# Patient Record
Sex: MALE | Race: WHITE | NOT HISPANIC OR LATINO | Employment: OTHER | URBAN - METROPOLITAN AREA
[De-identification: names, ages, dates, MRNs, and addresses within clinical notes are randomized per-mention and may not be internally consistent; named-entity substitution may affect disease eponyms.]

---

## 2017-01-21 DIAGNOSIS — I50.32 CONGESTIVE HEART FAILURE WITH LEFT VENTRICULAR DIASTOLIC DYSFUNCTION, CHRONIC: ICD-10-CM

## 2017-01-21 DIAGNOSIS — I48.0 PAF (PAROXYSMAL ATRIAL FIBRILLATION): ICD-10-CM

## 2017-01-22 RX ORDER — SOTALOL HYDROCHLORIDE 120 MG/1
TABLET ORAL
Qty: 60 TABLET | Refills: 11 | Status: SHIPPED | OUTPATIENT
Start: 2017-01-22 | End: 2017-02-16 | Stop reason: SDUPTHER

## 2017-01-29 ENCOUNTER — HOSPITAL ENCOUNTER (EMERGENCY)
Facility: HOSPITAL | Age: 82
Discharge: HOME | End: 2017-01-29
Attending: EMERGENCY MEDICINE
Payer: MEDICARE

## 2017-01-29 VITALS
TEMPERATURE: 99 F | OXYGEN SATURATION: 96 % | RESPIRATION RATE: 20 BRPM | SYSTOLIC BLOOD PRESSURE: 152 MMHG | BODY MASS INDEX: 28.33 KG/M2 | DIASTOLIC BLOOD PRESSURE: 68 MMHG | WEIGHT: 209.19 LBS | HEIGHT: 72 IN | HEART RATE: 73 BPM

## 2017-01-29 DIAGNOSIS — E86.0 DEHYDRATION: Primary | ICD-10-CM

## 2017-01-29 DIAGNOSIS — R53.1 WEAKNESS: ICD-10-CM

## 2017-01-29 LAB
ALBUMIN SERPL BCP-MCNC: 3.1 G/DL
ALP SERPL-CCNC: 75 U/L
ALT SERPL W/O P-5'-P-CCNC: 15 U/L
ANION GAP SERPL CALC-SCNC: 8 MMOL/L
AST SERPL-CCNC: 18 U/L
BASOPHILS # BLD AUTO: 0.1 K/UL
BASOPHILS NFR BLD: 0.6 %
BILIRUB SERPL-MCNC: 0.3 MG/DL
BILIRUB UR QL STRIP: NEGATIVE
BUN SERPL-MCNC: 26 MG/DL
CALCIUM SERPL-MCNC: 9 MG/DL
CHLORIDE SERPL-SCNC: 105 MMOL/L
CLARITY UR: CLEAR
CO2 SERPL-SCNC: 26 MMOL/L
COLOR UR: YELLOW
CREAT SERPL-MCNC: 2.4 MG/DL
DIFFERENTIAL METHOD: ABNORMAL
EOSINOPHIL # BLD AUTO: 0.1 K/UL
EOSINOPHIL NFR BLD: 1.2 %
ERYTHROCYTE [DISTWIDTH] IN BLOOD BY AUTOMATED COUNT: 14.9 %
EST. GFR  (AFRICAN AMERICAN): 27 ML/MIN/1.73 M^2
EST. GFR  (NON AFRICAN AMERICAN): 23 ML/MIN/1.73 M^2
FLUAV AG SPEC QL IA: NEGATIVE
FLUBV AG SPEC QL IA: NEGATIVE
GLUCOSE SERPL-MCNC: 205 MG/DL
GLUCOSE UR QL STRIP: NEGATIVE
HCT VFR BLD AUTO: 33.6 %
HGB BLD-MCNC: 10.9 G/DL
HGB UR QL STRIP: NEGATIVE
KETONES UR QL STRIP: NEGATIVE
LEUKOCYTE ESTERASE UR QL STRIP: NEGATIVE
LYMPHOCYTES # BLD AUTO: 1 K/UL
LYMPHOCYTES NFR BLD: 8.6 %
MCH RBC QN AUTO: 29.5 PG
MCHC RBC AUTO-ENTMCNC: 32.3 %
MCV RBC AUTO: 91 FL
MONOCYTES # BLD AUTO: 1.2 K/UL
MONOCYTES NFR BLD: 10.9 %
NEUTROPHILS # BLD AUTO: 8.9 K/UL
NEUTROPHILS NFR BLD: 78.7 %
NITRITE UR QL STRIP: NEGATIVE
PH UR STRIP: 6 [PH] (ref 5–8)
PLATELET # BLD AUTO: 215 K/UL
PMV BLD AUTO: 7.8 FL
POTASSIUM SERPL-SCNC: 4.7 MMOL/L
PROT SERPL-MCNC: 6.1 G/DL
PROT UR QL STRIP: NEGATIVE
RBC # BLD AUTO: 3.68 M/UL
SODIUM SERPL-SCNC: 139 MMOL/L
SP GR UR STRIP: 1.02 (ref 1–1.03)
SPECIMEN SOURCE: NORMAL
URN SPEC COLLECT METH UR: NORMAL
UROBILINOGEN UR STRIP-ACNC: NEGATIVE EU/DL
WBC # BLD AUTO: 11.3 K/UL

## 2017-01-29 PROCEDURE — 25000003 PHARM REV CODE 250: Performed by: EMERGENCY MEDICINE

## 2017-01-29 PROCEDURE — 25000242 PHARM REV CODE 250 ALT 637 W/ HCPCS: Performed by: EMERGENCY MEDICINE

## 2017-01-29 PROCEDURE — 87400 INFLUENZA A/B EACH AG IA: CPT

## 2017-01-29 PROCEDURE — 94640 AIRWAY INHALATION TREATMENT: CPT

## 2017-01-29 PROCEDURE — 80053 COMPREHEN METABOLIC PANEL: CPT

## 2017-01-29 PROCEDURE — 96360 HYDRATION IV INFUSION INIT: CPT

## 2017-01-29 PROCEDURE — 85025 COMPLETE CBC W/AUTO DIFF WBC: CPT

## 2017-01-29 PROCEDURE — 36415 COLL VENOUS BLD VENIPUNCTURE: CPT

## 2017-01-29 PROCEDURE — 81003 URINALYSIS AUTO W/O SCOPE: CPT

## 2017-01-29 PROCEDURE — 99284 EMERGENCY DEPT VISIT MOD MDM: CPT | Mod: 25

## 2017-01-29 RX ORDER — IPRATROPIUM BROMIDE AND ALBUTEROL SULFATE 2.5; .5 MG/3ML; MG/3ML
3 SOLUTION RESPIRATORY (INHALATION)
Status: COMPLETED | OUTPATIENT
Start: 2017-01-29 | End: 2017-01-29

## 2017-01-29 RX ORDER — SODIUM CHLORIDE 9 MG/ML
1000 INJECTION, SOLUTION INTRAVENOUS
Status: COMPLETED | OUTPATIENT
Start: 2017-01-29 | End: 2017-01-29

## 2017-01-29 RX ADMIN — SODIUM CHLORIDE 1000 ML: 0.9 INJECTION, SOLUTION INTRAVENOUS at 04:01

## 2017-01-29 RX ADMIN — IPRATROPIUM BROMIDE AND ALBUTEROL SULFATE 3 ML: .5; 3 SOLUTION RESPIRATORY (INHALATION) at 03:01

## 2017-01-29 NOTE — ED PROVIDER NOTES
Encounter Date: 1/29/2017    SCRIBE #1 NOTE: I, Sussy Garcia, am scribing for, and in the presence of,  Dr. Raygoza. I have scribed the entire note.       History     Chief Complaint   Patient presents with    Fatigue     weakness x 3-4 days.     Review of patient's allergies indicates:   Allergen Reactions    Lisinopril      Cough      Penicillins      Unknown       HPI Comments: 01/29/2017  3:06 PM     Chief Complaint: Fatigue       The patient is a 87 y.o. male who is presenting with fatigue that started 4 days ago. The pt caregiver reports the pt is struggling to walk. No alleviating or exacerbating factors. Associated sx of nausea and weakness. Denies abdominal pain and dysuria. The pt has a past medical history of Asthma; CHF; Coronary artery disease; Dementia; Diabetes mellitus; Dyslipidemia; Hyperlipidemia; Hypertension; and Pacemaker. The pt has a past surgical history that includes Coronary angioplasty with stent (pacemaker); Total hip arthroplasty (Feb 2013 Left); Hernia repair; Cardiac pacemaker placement; and Colonoscopy (N/A, 3/28/2016).'    The history is provided by the patient and a caregiver.     Past Medical History   Diagnosis Date    Asthma     CHF (congestive heart failure)     Coronary artery disease      6 stents    Dementia     Diabetes mellitus     Dyslipidemia     Hyperlipidemia     Hypertension     Pacemaker      No past medical history pertinent negatives.  Past Surgical History   Procedure Laterality Date    Coronary angioplasty with stent placement  pacemaker    Total hip arthroplasty  Feb 2013 Left    Hernia repair      Cardiac pacemaker placement      Colonoscopy N/A 3/28/2016     Procedure: COLONOSCOPY;  Surgeon: Del Ruvalcaba MD;  Location: [facility];  Service: Endoscopy;  Laterality: N/A;     Family History   Problem Relation Age of Onset    Diabetes Mother      Social History   Substance Use Topics    Smoking status: Former Smoker     Years: 40.00      Quit date: 1/1/1952    Smokeless tobacco: Never Used    Alcohol use No     Review of Systems   Constitutional: Positive for fatigue. Negative for fever.   HENT: Negative for sore throat.    Eyes: Negative for visual disturbance.   Respiratory: Negative for shortness of breath.    Cardiovascular: Negative for chest pain.   Gastrointestinal: Negative for abdominal pain and nausea.   Genitourinary: Negative for dysuria.   Musculoskeletal: Negative for back pain.   Skin: Negative for rash.   Neurological: Positive for weakness.   Hematological: Does not bruise/bleed easily.   Psychiatric/Behavioral: Negative for confusion.       Physical Exam   Initial Vitals   BP Pulse Resp Temp SpO2   01/29/17 1446 01/29/17 1446 01/29/17 1446 01/29/17 1446 01/29/17 1446   152/68 67 18 98.5 °F (36.9 °C) 96 %     Physical Exam    Nursing note and vitals reviewed.  Constitutional: He appears well-developed and well-nourished.   HENT:   Head: Normocephalic and atraumatic.   Eyes: Conjunctivae are normal.   Neck: Normal range of motion. Neck supple.   Neck is supple for age.    Cardiovascular: Normal rate, regular rhythm, normal heart sounds and intact distal pulses. Exam reveals no gallop and no friction rub.    No murmur heard.  Pulmonary/Chest: He has wheezes (Expiratory wheezes.). He has no rhonchi. He has no rales.   Abdominal: Soft. Bowel sounds are normal. There is no tenderness. There is no CVA tenderness.   Musculoskeletal: Normal range of motion.   Neurological: He is alert and oriented to person, place, and time.   Skin: Skin is warm.   Psychiatric: He has a normal mood and affect.         ED Course   Procedures  Labs Reviewed - No data to display          Medical Decision Making:   History:   Old Records Summarized: records from previous admission(s).  Independently Interpreted Test(s):   I have ordered and independently interpreted X-rays - see summary below.       <> Summary of X-Ray Reading(s): chest x-ray interpreted by  me reveals no infiltrates, effusions or mediastinal widening  Clinical Tests:   Lab Tests: Ordered and Reviewed  The following lab test(s) were unremarkable: CBC and CMP  ED Management:  Thierry Fu is a 87 y.o. male who presents with  generalized weakness over the past 2 days.  He has had no fever with no identified source of infection.  Chest x-ray fails to demonstrate any evidence of pneumonia.  Urinalysis demonstrates no pyuria or bacteriuria.  He has no leukocytosis to suggest systemic infection.  He has a mild increase in creatinine to 2.4 increased from his usual baseline of around 2 most likely secondary to dehydration.  He is given 1 L fluids and discharged.  Influenza is negative.            Scribe Attestation:   Scribe #1: I performed the above scribed service and the documentation accurately describes the services I performed. I attest to the accuracy of the note.    Attending Attestation:           Physician Attestation for Scribe:  Physician Attestation Statement for Scribe #1: I, Dr. Raygoza, reviewed documentation, as scribed by Sussy Garcia in my presence, and it is both accurate and complete.                 ED Course     Clinical Impression:   The primary encounter diagnosis was Dehydration. A diagnosis of Weakness was also pertinent to this visit.          Jorge Luis Raygoza III, MD  01/29/17 [id number]

## 2017-01-29 NOTE — DISCHARGE INSTRUCTIONS
Dehydration    The human body is comprised largely of water. If you lose more fluids than you take in, you can become dehydrated. This means there are not enough fluids in your body for it to function right. Mild dehydration can cause weakness, confusion, or muscle cramps. In extreme cases, it can lead to brain damage and even death. That's why prompt treatment is crucial.  Risk factors  Anyone can become dehydrated. But infants, children, and older adults are at greatest risk. You are most likely to lose fluids with severe vomiting, diarrhea, or a fever. Exercising or working hard--especially in hot weather--can also cause excess fluid loss.  What to do  Drinking liquids is the best way to prevent dehydration. Water is best, but juice or frozen pops can also help. Your doctor may suggest electrolyte solutions for sick infants and young children.  When to go to the emergency room (ER)  Go to an ER right away for these symptoms:  Adults  · Very dark urine and little urine output  · Dizziness, weakness, confusion, fainting  Children  · Sunken eyes  · Little or no urine output (for infants, no wet diaper in 8 hours)  · Very dark urine  · Skin that doesn't bounce back quickly when pinched  · Crying without tears  What to expect in the ER  Your blood pressure, temperature, and heart rate will be checked. You may have blood or urine tests. The main treatment for dehydration is fluids. You may be given these to drink. Or, you may receive them through a vein in your arm. You also may be treated for diarrhea, vomiting, or a high fever.   © 5574-3049 [employer]. [address] [place] [address], [place] [address]. All rights reserved. This information is not intended as a substitute for professional medical care. Always follow your healthcare professional's instructions.

## 2017-01-29 NOTE — ED AVS SNAPSHOT
[facility] [address] [place] [address]               Thierry Fu   2017  2:49 PM   ED    Description:  Male : 10/8/1929   Department:  [facility]           Your Care was Coordinated By:     Provider Role From To    Jorge Luis Raygoza III, MD Attending Provider 17 1442 --      Reason for Visit     Fatigue           Diagnoses this Visit        Comments    Dehydration    -  Primary     Weakness           ED Disposition     None           To Do List           Follow-up Information     Follow up with Rosendo Barrow Jr, MD In 3 days.    Specialty:  Family Medicine    Contact information:    [address] [facility] [address]  [phone]        [facility]     [facility] -  Assistance  Registered nurses in the [facility] provide clinical advisement, health education, appointment booking, and other advisory services.  Call for this free service at [phone].             Medications           Message regarding Medications     Verify the changes and/or additions to your medication regime listed below are the same as discussed with your clinician today.  If any of these changes or additions are incorrect, please notify your healthcare provider.        These medications were administered today        Dose Freq    albuterol-ipratropium 2.5mg-0.5mg/3mL nebulizer solution 3 mL 3 mL ED 1 Time    Sig: Take 3 mLs by nebulization ED 1 Time.    Class: Normal    Route: Nebulization    0.9%  NaCl infusion 1,000 mL ED 1 Time    Sig: Inject 1,000 mLs into the vein ED 1 Time.    Class: Normal    Route: Intravenous           Verify that the below list of medications is an accurate representation of the medications you are currently taking.  If none reported, the list may be blank. If incorrect, please contact your healthcare provider. Carry this list with you in case of emergency.           Current  "Medications     BD INSULIN PEN NEEDLE UF SHORT 31 gauge x 5/16" Ndle use as directed once daily    clopidogrel (PLAVIX) 75 mg tablet take 1 tablet by mouth once daily    duloxetine (CYMBALTA) 60 MG capsule take 1 capsule by mouth once daily    ferrous gluconate (FERGON) 324 MG tablet Take 1 tablet (324 mg total) by mouth 2 (two) times daily with meals.    furosemide (LASIX) 20 MG tablet Take 20 mg by mouth daily as needed. For 3 days. Started 3/5/16    glyBURIDE (DIABETA) 5 MG tablet Take 1 tablet (5 mg total) by mouth once daily.    HUMALOG KWIKPEN 100 unit/mL InPn pen inject 5 units AT LUNCH TIME MEAL    inhalation device (AEROCHAMBER PLUS FLOW-VU) Use as directed for inhalation.    insulin glargine (LANTUS SOLOSTAR) 100 unit/mL (3 mL) InPn pen Inject 15 Units into the skin once daily. inject 15 units subcutaneously once daily    insulin lispro (HUMALOG KWIKPEN) 100 unit/mL InPn pen inject 5 units AT LUNCH TIME MEAL Use in addition to sliding scale    isosorbide mononitrate (IMDUR) 120 MG 24 hr tablet Take 1 tablet (120 mg total) by mouth once daily.    nitroGLYCERIN (NITROSTAT) 0.4 MG SL tablet Place 0.4 mg under the tongue every 5 (five) minutes as needed.      nitroGLYCERIN 0.4 MG/DOSE TL SPRY (NITROLINGUAL) 400 mcg/spray spray USE 1 TO 2 SPRAYS UNDER THE TONGUE IF NEEDED FOR CHEST PAINS    ONETOUCH ULTRA TEST Strp TEST three times a day    pantoprazole (PROTONIX) 40 MG tablet Take 1 tablet (40 mg total) by mouth once daily.    quetiapine (SEROQUEL) 50 MG tablet Take 2 tabs in AM, 1/2 at noon, and 1every evening    rivastigmine (EXELON) 9.5 mg/24 hr PT24 apply 1 patch once daily    senna-docusate 8.6-50 mg (PERICOLACE) 8.6-50 mg per tablet Take 1 tablet by mouth 2 (two) times daily.    simvastatin (ZOCOR) 40 MG tablet take 1 tablet by mouth at bedtime    sotalol (BETAPACE) 120 MG Tab take 1 tablet by mouth twice a day    valsartan (DIOVAN) 160 MG tablet Take 1 tablet (160 mg total) by mouth once daily. Takes at " night    VESICARE 5 mg tablet take 1 tablet by mouth once daily           Clinical Reference Information           Your Vitals Were     BP Pulse Temp Resp Height Weight    152/68 (BP Location: Right arm, Patient Position: Sitting) 73 98.5 °F (36.9 °C) (Oral) 20 6' (1.829 m) 94.9 kg (209 lb 3.5 oz)    SpO2 BMI             96% 28.37 kg/m2         Allergies as of 1/29/2017        Reactions    Lisinopril     Cough    Penicillins     Unknown      Immunizations Administered on Date of Encounter - 1/29/2017     None      ED Micro, Lab, POCT     Start Ordered       Status Ordering Provider    01/29/17 1512 01/29/17 1511  Urinalysis  STAT      Final result     01/29/17 1512 01/29/17 1511  Comprehensive metabolic panel  STAT      Final result     01/29/17 1512 01/29/17 1511  CBC auto differential  STAT      Final result     01/29/17 1512 01/29/17 1511  Influenza antigen Nasopharyngeal Swab  Once      Final result       ED Imaging Orders     Start Ordered       Status Ordering Provider    01/29/17 1512 01/29/17 1511  X-Ray Chest PA And Lateral  1 time imaging      Final result         Discharge Instructions         Dehydration    The human body is comprised largely of water. If you lose more fluids than you take in, you can become dehydrated. This means there are not enough fluids in your body for it to function right. Mild dehydration can cause weakness, confusion, or muscle cramps. In extreme cases, it can lead to brain damage and even death. That's why prompt treatment is crucial.  Risk factors  Anyone can become dehydrated. But infants, children, and older adults are at greatest risk. You are most likely to lose fluids with severe vomiting, diarrhea, or a fever. Exercising or working hard--especially in hot weather--can also cause excess fluid loss.  What to do  Drinking liquids is the best way to prevent dehydration. Water is best, but juice or frozen pops can also help. Your doctor may suggest electrolyte solutions for  sick infants and young children.  When to go to the emergency room (ER)  Go to an ER right away for these symptoms:  Adults  · Very dark urine and little urine output  · Dizziness, weakness, confusion, fainting  Children  · Sunken eyes  · Little or no urine output (for infants, no wet diaper in 8 hours)  · Very dark urine  · Skin that doesn't bounce back quickly when pinched  · Crying without tears  What to expect in the ER  Your blood pressure, temperature, and heart rate will be checked. You may have blood or urine tests. The main treatment for dehydration is fluids. You may be given these to drink. Or, you may receive them through a vein in your arm. You also may be treated for diarrhea, vomiting, or a high fever.   © 5300-6694 [employer]. [address] [place] [address], [place] [address]. All rights reserved. This information is not intended as a substitute for professional medical care. Always follow your healthcare professional's instructions.          Smoking Cessation     If you would like to quit smoking:   You may be eligible for free services if you are a [place] resident and started smoking cigarettes before September 1, 1988.  Call the Smoking Cessation Trust (SCT) toll free at (620) 382-0928 or (217) 039-8909.   Call 6-869-QUIT-NOW if you do not meet the above criteria.             [facility] complies with applicable Federal civil rights laws and does not discriminate on the basis of race, color, national origin, age, disability, or sex.        Language Assistance Services     ATTENTION: Language assistance services are available, free of charge. Please call [phone].      ATENCIÓN: Si habla español, tiene a otto disposición servicios gratuitos de asistencia lingüística. Llame al [phone].     CHÚ Ý: N?u b?n nói Ti?ng Vi?t, có các d?ch v? h? tr? ngôn ng? mi?n phí dành cho b?n. G?i s? [phone].

## 2017-01-30 NOTE — ED NOTES
Pt presents to ED with c/o generalized weakness that began a few days ago. Pt family reports that pt normally walks but has been struggling to walk the last few days. Pt is awake, alert, and oriented to person. Pt at normal mental baseline per his family. Skin warm, dry to touch. Respirations even, nonlabored. NAD noted. VSS.

## 2017-01-31 DIAGNOSIS — J20.9 ACUTE BRONCHITIS, UNSPECIFIED ORGANISM: Primary | ICD-10-CM

## 2017-01-31 RX ORDER — PREDNISONE 20 MG/1
TABLET ORAL
Qty: 15 TABLET | Refills: 0 | Status: ON HOLD | OUTPATIENT
Start: 2017-01-31 | End: 2017-02-16 | Stop reason: HOSPADM

## 2017-01-31 RX ORDER — ALBUTEROL SULFATE 90 UG/1
2 AEROSOL, METERED RESPIRATORY (INHALATION) EVERY 6 HOURS PRN
Qty: 1 INHALER | Refills: 3 | Status: SHIPPED | OUTPATIENT
Start: 2017-01-31 | End: 2017-02-16 | Stop reason: SDUPTHER

## 2017-01-31 RX ORDER — PROMETHAZINE HYDROCHLORIDE AND CODEINE PHOSPHATE 6.25; 1 MG/5ML; MG/5ML
5 SOLUTION ORAL EVERY 6 HOURS PRN
Qty: 240 ML | Refills: 0 | Status: SHIPPED | OUTPATIENT
Start: 2017-01-31

## 2017-02-07 ENCOUNTER — HOSPITAL ENCOUNTER (INPATIENT)
Facility: HOSPITAL | Age: 82
LOS: 9 days | Discharge: HOME HEALTH | DRG: 202 | End: 2017-02-16
Attending: EMERGENCY MEDICINE | Admitting: FAMILY MEDICINE
Payer: MEDICARE

## 2017-02-07 DIAGNOSIS — E78.00 PURE HYPERCHOLESTEROLEMIA: ICD-10-CM

## 2017-02-07 DIAGNOSIS — Z95.5 S/P CORONARY ARTERY STENT PLACEMENT: ICD-10-CM

## 2017-02-07 DIAGNOSIS — R06.02 SOB (SHORTNESS OF BREATH): ICD-10-CM

## 2017-02-07 DIAGNOSIS — F02.80 LATE ONSET ALZHEIMER'S DISEASE WITHOUT BEHAVIORAL DISTURBANCE: ICD-10-CM

## 2017-02-07 DIAGNOSIS — I50.32 CHRONIC CONGESTIVE HEART FAILURE WITH LEFT VENTRICULAR DIASTOLIC DYSFUNCTION: ICD-10-CM

## 2017-02-07 DIAGNOSIS — I48.0 PAF (PAROXYSMAL ATRIAL FIBRILLATION): ICD-10-CM

## 2017-02-07 DIAGNOSIS — J45.21 MILD INTERMITTENT ASTHMA WITH ACUTE EXACERBATION: Primary | ICD-10-CM

## 2017-02-07 DIAGNOSIS — G30.1 LATE ONSET ALZHEIMER'S DISEASE WITHOUT BEHAVIORAL DISTURBANCE: ICD-10-CM

## 2017-02-07 DIAGNOSIS — I25.10 CAD (CORONARY ARTERY DISEASE): ICD-10-CM

## 2017-02-07 LAB
ALBUMIN SERPL BCP-MCNC: 2.9 G/DL
ALP SERPL-CCNC: 64 U/L
ALT SERPL W/O P-5'-P-CCNC: 14 U/L
ANION GAP SERPL CALC-SCNC: 10 MMOL/L
AST SERPL-CCNC: 13 U/L
BASOPHILS # BLD AUTO: 0 K/UL
BASOPHILS NFR BLD: 0.3 %
BILIRUB SERPL-MCNC: 0.3 MG/DL
BNP SERPL-MCNC: 120 PG/ML
BUN SERPL-MCNC: 43 MG/DL
CALCIUM SERPL-MCNC: 9.3 MG/DL
CHLORIDE SERPL-SCNC: 103 MMOL/L
CO2 SERPL-SCNC: 25 MMOL/L
CREAT SERPL-MCNC: 2.4 MG/DL
D DIMER PPP IA.FEU-MCNC: 0.64 MG/L FEU
DIFFERENTIAL METHOD: ABNORMAL
EOSINOPHIL # BLD AUTO: 0 K/UL
EOSINOPHIL NFR BLD: 0.1 %
ERYTHROCYTE [DISTWIDTH] IN BLOOD BY AUTOMATED COUNT: 14.3 %
EST. GFR  (AFRICAN AMERICAN): 27 ML/MIN/1.73 M^2
EST. GFR  (NON AFRICAN AMERICAN): 23 ML/MIN/1.73 M^2
GLUCOSE SERPL-MCNC: 186 MG/DL
HCT VFR BLD AUTO: 36.8 %
HGB BLD-MCNC: 11.8 G/DL
LYMPHOCYTES # BLD AUTO: 0.9 K/UL
LYMPHOCYTES NFR BLD: 7.3 %
MAGNESIUM SERPL-MCNC: 1.8 MG/DL
MCH RBC QN AUTO: 29.3 PG
MCHC RBC AUTO-ENTMCNC: 32.2 %
MCV RBC AUTO: 91 FL
MONOCYTES # BLD AUTO: 0.7 K/UL
MONOCYTES NFR BLD: 5.7 %
NEUTROPHILS # BLD AUTO: 11.2 K/UL
NEUTROPHILS NFR BLD: 86.6 %
PHOSPHATE SERPL-MCNC: 5.2 MG/DL
PLATELET # BLD AUTO: 368 K/UL
PMV BLD AUTO: 7.6 FL
POTASSIUM SERPL-SCNC: 4.9 MMOL/L
PROT SERPL-MCNC: 6.2 G/DL
RBC # BLD AUTO: 4.03 M/UL
SODIUM SERPL-SCNC: 138 MMOL/L
TROPONIN I SERPL DL<=0.01 NG/ML-MCNC: 0.02 NG/ML
WBC # BLD AUTO: 13 K/UL

## 2017-02-07 PROCEDURE — 25000003 PHARM REV CODE 250: Performed by: EMERGENCY MEDICINE

## 2017-02-07 PROCEDURE — 83735 ASSAY OF MAGNESIUM: CPT

## 2017-02-07 PROCEDURE — 83880 ASSAY OF NATRIURETIC PEPTIDE: CPT

## 2017-02-07 PROCEDURE — 85025 COMPLETE CBC W/AUTO DIFF WBC: CPT

## 2017-02-07 PROCEDURE — 80053 COMPREHEN METABOLIC PANEL: CPT

## 2017-02-07 PROCEDURE — 94760 N-INVAS EAR/PLS OXIMETRY 1: CPT

## 2017-02-07 PROCEDURE — 27000221 HC OXYGEN, UP TO 24 HOURS

## 2017-02-07 PROCEDURE — 94640 AIRWAY INHALATION TREATMENT: CPT

## 2017-02-07 PROCEDURE — 93005 ELECTROCARDIOGRAM TRACING: CPT

## 2017-02-07 PROCEDURE — 11000001 HC ACUTE MED/SURG PRIVATE ROOM

## 2017-02-07 PROCEDURE — 36415 COLL VENOUS BLD VENIPUNCTURE: CPT

## 2017-02-07 PROCEDURE — 84484 ASSAY OF TROPONIN QUANT: CPT

## 2017-02-07 PROCEDURE — 84100 ASSAY OF PHOSPHORUS: CPT

## 2017-02-07 PROCEDURE — 25000242 PHARM REV CODE 250 ALT 637 W/ HCPCS: Performed by: EMERGENCY MEDICINE

## 2017-02-07 PROCEDURE — 99284 EMERGENCY DEPT VISIT MOD MDM: CPT | Mod: 25

## 2017-02-07 PROCEDURE — 85379 FIBRIN DEGRADATION QUANT: CPT

## 2017-02-07 RX ORDER — GLYBURIDE 5 MG/1
5 TABLET ORAL DAILY
Status: DISCONTINUED | OUTPATIENT
Start: 2017-02-08 | End: 2017-02-08

## 2017-02-07 RX ORDER — QUETIAPINE FUMARATE 25 MG/1
50 TABLET, FILM COATED ORAL 2 TIMES DAILY
Status: DISCONTINUED | OUTPATIENT
Start: 2017-02-07 | End: 2017-02-14

## 2017-02-07 RX ORDER — METHYLPREDNISOLONE SOD SUCC 125 MG
80 VIAL (EA) INJECTION EVERY 8 HOURS
Status: DISCONTINUED | OUTPATIENT
Start: 2017-02-07 | End: 2017-02-11

## 2017-02-07 RX ORDER — DULOXETIN HYDROCHLORIDE 30 MG/1
60 CAPSULE, DELAYED RELEASE ORAL DAILY
Status: DISCONTINUED | OUTPATIENT
Start: 2017-02-08 | End: 2017-02-16 | Stop reason: HOSPADM

## 2017-02-07 RX ORDER — IPRATROPIUM BROMIDE AND ALBUTEROL SULFATE 2.5; .5 MG/3ML; MG/3ML
3 SOLUTION RESPIRATORY (INHALATION) EVERY 6 HOURS
Status: DISCONTINUED | OUTPATIENT
Start: 2017-02-08 | End: 2017-02-07

## 2017-02-07 RX ORDER — RIVASTIGMINE 9.5 MG/24H
1 PATCH, EXTENDED RELEASE TRANSDERMAL DAILY
Status: DISCONTINUED | OUTPATIENT
Start: 2017-02-08 | End: 2017-02-16 | Stop reason: HOSPADM

## 2017-02-07 RX ORDER — INSULIN ASPART 100 [IU]/ML
0-5 INJECTION, SOLUTION INTRAVENOUS; SUBCUTANEOUS
Status: DISCONTINUED | OUTPATIENT
Start: 2017-02-07 | End: 2017-02-16 | Stop reason: HOSPADM

## 2017-02-07 RX ORDER — ACETAMINOPHEN 325 MG/1
650 TABLET ORAL EVERY 6 HOURS PRN
Status: DISCONTINUED | OUTPATIENT
Start: 2017-02-07 | End: 2017-02-16 | Stop reason: HOSPADM

## 2017-02-07 RX ORDER — IBUPROFEN 200 MG
16 TABLET ORAL
Status: DISCONTINUED | OUTPATIENT
Start: 2017-02-07 | End: 2017-02-16 | Stop reason: HOSPADM

## 2017-02-07 RX ORDER — IPRATROPIUM BROMIDE AND ALBUTEROL SULFATE 2.5; .5 MG/3ML; MG/3ML
3 SOLUTION RESPIRATORY (INHALATION)
Status: COMPLETED | OUTPATIENT
Start: 2017-02-07 | End: 2017-02-07

## 2017-02-07 RX ORDER — IPRATROPIUM BROMIDE AND ALBUTEROL SULFATE 2.5; .5 MG/3ML; MG/3ML
3 SOLUTION RESPIRATORY (INHALATION) EVERY 6 HOURS
Status: DISCONTINUED | OUTPATIENT
Start: 2017-02-08 | End: 2017-02-16 | Stop reason: HOSPADM

## 2017-02-07 RX ORDER — PANTOPRAZOLE SODIUM 40 MG/1
40 TABLET, DELAYED RELEASE ORAL DAILY
Status: DISCONTINUED | OUTPATIENT
Start: 2017-02-08 | End: 2017-02-16 | Stop reason: HOSPADM

## 2017-02-07 RX ORDER — IBUPROFEN 200 MG
24 TABLET ORAL
Status: DISCONTINUED | OUTPATIENT
Start: 2017-02-07 | End: 2017-02-16 | Stop reason: HOSPADM

## 2017-02-07 RX ORDER — SIMVASTATIN 40 MG/1
40 TABLET, FILM COATED ORAL NIGHTLY
Status: DISCONTINUED | OUTPATIENT
Start: 2017-02-07 | End: 2017-02-09

## 2017-02-07 RX ORDER — GLUCAGON 1 MG
1 KIT INJECTION
Status: DISCONTINUED | OUTPATIENT
Start: 2017-02-07 | End: 2017-02-16 | Stop reason: HOSPADM

## 2017-02-07 RX ORDER — ONDANSETRON 2 MG/ML
4 INJECTION INTRAMUSCULAR; INTRAVENOUS EVERY 8 HOURS PRN
Status: DISCONTINUED | OUTPATIENT
Start: 2017-02-07 | End: 2017-02-16 | Stop reason: HOSPADM

## 2017-02-07 RX ORDER — AZITHROMYCIN 250 MG/1
TABLET, FILM COATED ORAL
Qty: 6 TABLET | Refills: 0 | Status: ON HOLD | OUTPATIENT
Start: 2017-02-07 | End: 2017-02-16 | Stop reason: HOSPADM

## 2017-02-07 RX ORDER — VALSARTAN 80 MG/1
160 TABLET ORAL DAILY
Status: DISCONTINUED | OUTPATIENT
Start: 2017-02-08 | End: 2017-02-14

## 2017-02-07 RX ORDER — CLOPIDOGREL BISULFATE 75 MG/1
75 TABLET ORAL DAILY
Status: DISCONTINUED | OUTPATIENT
Start: 2017-02-08 | End: 2017-02-16 | Stop reason: HOSPADM

## 2017-02-07 RX ORDER — ISOSORBIDE MONONITRATE 60 MG/1
120 TABLET, EXTENDED RELEASE ORAL DAILY
Status: DISCONTINUED | OUTPATIENT
Start: 2017-02-08 | End: 2017-02-13

## 2017-02-07 RX ADMIN — SIMVASTATIN 40 MG: 40 TABLET, FILM COATED ORAL at 10:02

## 2017-02-07 RX ADMIN — QUETIAPINE FUMARATE 50 MG: 25 TABLET, FILM COATED ORAL at 10:02

## 2017-02-07 RX ADMIN — IPRATROPIUM BROMIDE AND ALBUTEROL SULFATE 3 ML: .5; 3 SOLUTION RESPIRATORY (INHALATION) at 07:02

## 2017-02-07 NOTE — IP AVS SNAPSHOT
[address] [place] [address]  Phone: [phone]           Patient Discharge Instructions     Our goal is to set you up for success. This packet includes information on your condition, medications, and your home care. It will help you to care for yourself so you don't get sicker and need to go back to the hospital.     Please ask your nurse if you have any questions.        There are many details to remember when preparing to leave the hospital. Here is what you will need to do:    1. Take your medicine. If you are prescribed medications, review your Medication List in the following pages. You may have new medications to  at the pharmacy and others that you'll need to stop taking. Review the instructions for how and when to take your medications. Talk with your doctor or nurses if you are unsure of what to do.     2. Go to your follow-up appointments. Specific follow-up information is listed in the following pages. Your may be contacted by a transition nurse or clinical provider about future appointments. Be sure we have all of the phone numbers to reach you, if needed. Please contact your provider's office if you are unable to make an appointment.     3. Watch for warning signs. Your doctor or nurse will give you detailed warning signs to watch for and when to call for assistance. These instructions may also include educational information about your condition. If you experience any of warning signs to your health, call your doctor.               [facility]  Unless otherwise directed by your provider, please contact [facility], our nurse care line that is available for 24/7 assistance.     [phone] (toll-free)    Registered nurses in the [facility] provide clinical advisement, health education, appointment booking, and other advisory services.                    ** Verify the list of medication(s) below is accurate and up to date.  Carry this with you in case of emergency. If your medications have changed, please notify your healthcare provider.             Medication List      START taking these medications        Additional Info    Begin Date AM Noon PM Bedtime    acetaminophen 325 MG tablet   Commonly known as:  TYLENOL   Quantity:  30 tablet   Refills:  0   Dose:  650 mg    Instructions:  Take 2 tablets (650 mg total) by mouth every 6 (six) hours as needed for Pain or Temperature greater than (or equal to 101 degree F).                            insulin aspart 100 unit/mL Inpn pen   Commonly known as:  NovoLOG   Quantity:  3 mL   Refills:  0   Dose:  0-5 Units    Last time this was given:  4 Units on 2/16/2017  8:00 AM   Instructions:  Inject 0-5 Units into the skin before meals and at bedtime as needed (Hyperglycemia).                            polyethylene glycol 17 gram Pwpk   Commonly known as:  GLYCOLAX   Quantity:  30 each   Refills:  0   Dose:  17 g    Last time this was given:  17 g on 2/16/2017 10:00 AM   Instructions:  Take 17 g by mouth once daily.                              CHANGE how you take these medications        Additional Info    Begin Date AM Noon PM Bedtime    insulin glargine 100 unit/mL (3 mL) Inpn pen   Commonly known as:  LANTUS SOLOSTAR   Quantity:  1 Syringe   Refills:  11   Dose:  18 Units   What changed:  how much to take    Instructions:  Inject 18 Units into the skin once daily. inject 15 units subcutaneously once daily                            isosorbide mononitrate 60 MG 24 hr tablet   Commonly known as:  IMDUR   Quantity:  30 tablet   Refills:  11   Dose:  60 mg   What changed:    - medication strength  - how much to take    Last time this was given:  60 mg on 2/16/2017 10:00 AM   Instructions:  Take 1 tablet (60 mg total) by mouth once daily.                               nitroGLYCERIN 0.4 MG/DOSE TL SPRY 400 mcg/spray spray   Commonly known as:  NITROLINGUAL   Quantity:  12 g   Refills:  10   What  changed:  Another medication with the same name was removed. Continue taking this medication, and follow the directions you see here.    Instructions:  USE 1 TO 2 SPRAYS UNDER THE TONGUE IF NEEDED FOR CHEST PAINS                            predniSONE 10 MG tablet   Commonly known as:  DELTASONE   Quantity:  4 tablet   Refills:  0   Dose:  10 mg   What changed:    - medication strength  - how much to take  - how to take this  - when to take this  - additional instructions    Last time this was given:  25 mg on 2/16/2017 10:00 AM   Instructions:  Take 1 tablet (10 mg total) by mouth once daily.                               quetiapine 25 MG Tab   Commonly known as:  SEROQUEL   Quantity:  60 tablet   Refills:  0   Dose:  25 mg   What changed:    - medication strength  - how much to take  - how to take this  - when to take this  - additional instructions    Last time this was given:  25 mg on 2/16/2017 10:00 AM   Instructions:  Take 1 tablet (25 mg total) by mouth 2 (two) times daily.                                 CONTINUE taking these medications        Additional Info    Begin Date AM Noon PM Bedtime    albuterol 90 mcg/actuation inhaler   Quantity:  1 Inhaler   Refills:  3   Dose:  2 puff    Instructions:  Inhale 2 puffs into the lungs every 6 (six) hours as needed for Wheezing. Rescue                            clopidogrel 75 mg tablet   Commonly known as:  PLAVIX   Quantity:  30 tablet   Refills:  11    Last time this was given:  75 mg on 2/16/2017 10:00 AM   Instructions:  take 1 tablet by mouth once daily                               duloxetine 60 MG capsule   Commonly known as:  CYMBALTA   Quantity:  30 capsule   Refills:  11    Last time this was given:  60 mg on 2/16/2017 10:00 AM   Instructions:  take 1 capsule by mouth once daily                               ferrous gluconate 324 MG tablet   Commonly known as:  FERGON   Refills:  0   Dose:  324 mg    Instructions:  Take 1 tablet (324 mg total) by mouth 2  (two) times daily with meals.                                  inhalation device   Commonly known as:  AEROCHAMBER PLUS FLOW-VU   Quantity:  1 Device   Refills:  0    Instructions:  Use as directed for inhalation.                            insulin lispro 100 unit/mL Inpn pen   Commonly known as:  HUMALOG KWIKPEN   Quantity:  15 mL   Refills:  11    Instructions:  inject 5 units AT LUNCH TIME MEAL Use in addition to sliding scale                            ONETOUCH ULTRA TEST Strp   Quantity:  100 strip   Refills:  11   Comments:  Patient now on insulin (250.72)   Generic drug:  blood sugar diagnostic    Instructions:  TEST three times a day                            pantoprazole 40 MG tablet   Commonly known as:  PROTONIX   Quantity:  30 tablet   Refills:  0   Dose:  40 mg    Last time this was given:  40 mg on 2/16/2017 10:00 AM   Instructions:  Take 1 tablet (40 mg total) by mouth once daily.                               promethazine-codeine 6.25-10 mg/5 ml 6.25-10 mg/5 mL syrup   Commonly known as:  PHENERGAN with CODEINE   Quantity:  240 mL   Refills:  0   Dose:  5 mL    Instructions:  Take 5 mLs by mouth every 6 (six) hours as needed for Cough.                            rivastigmine 9.5 mg/24 hr Pt24   Commonly known as:  EXELON   Quantity:  30 patch   Refills:  11    Last time this was given:  1 patch on 2/16/2017 10:00 AM   Instructions:  apply 1 patch once daily                               senna-docusate 8.6-50 mg 8.6-50 mg per tablet   Commonly known as:  PERICOLACE   Refills:  0   Dose:  1 tablet    Instructions:  Take 1 tablet by mouth 2 (two) times daily.                                  simvastatin 40 MG tablet   Commonly known as:  ZOCOR   Quantity:  30 tablet   Refills:  11    Last time this was given:  40 mg on 2/8/2017  8:25 PM   Instructions:  take 1 tablet by mouth at bedtime                               sotalol 120 MG Tab   Commonly known as:  BETAPACE   Quantity:  60 tablet   Refills:  11  "   Last time this was given:  120 mg on 2/16/2017 10:00 AM   Instructions:  take 1 tablet by mouth twice a day                                  VESICARE 5 MG tablet   Quantity:  30 tablet   Refills:  11   Generic drug:  solifenacin    Instructions:  take 1 tablet by mouth once daily                                 STOP taking these medications     azithromycin 250 MG tablet   Commonly known as:  ZITHROMAX Z-UNA       BD INSULIN PEN NEEDLE UF SHORT 31 gauge x 5/16" Ndle   Generic drug:  pen needle, diabetic       furosemide 20 MG tablet   Commonly known as:  LASIX       glyBURIDE 5 MG tablet   Commonly known as:  DIABETA       valsartan 160 MG tablet   Commonly known as:  DIOVAN            Where to Get Your Medications      These medications were sent to [id number] - [place], [place] - [address] [facility]  [address] [facility] [address]     Phone:  [phone]     acetaminophen 325 MG tablet    insulin aspart 100 unit/mL Inpn pen    insulin glargine 100 unit/mL (3 mL) Inpn pen    predniSONE 10 MG tablet         You can get these medications from any pharmacy     Bring a paper prescription for each of these medications     isosorbide mononitrate 60 MG 24 hr tablet    polyethylene glycol 17 gram Pwpk    quetiapine 25 MG Tab                  Please bring to all follow up appointments:    1. A copy of your discharge instructions.  2. All medicines you are currently taking in their original bottles.  3. Identification and insurance card.    Please arrive 15 minutes ahead of scheduled appointment time.    Please call 24 hours in advance if you must reschedule your appointment and/or time.        Your Scheduled Appointments     Feb 23, 2017 10:40 AM [facility]   Hospital Follow Up with Deborah Arndt, ASADP-C   [place] [facility]    [address] [facility]  [place] [id number] [phone]              Follow-up Information     Follow up with Rosendo Barrow Jr, MD In 2 weeks.    Specialty:  Family Medicine    " "Contact information:    [address] [facility]  [place] [address]  [phone]          Follow up with [facility].    Specialty:  Home Health Services    Why:  Home Health    Contact information:    [address] [facility] [place] [address]  [phone]          Follow up with [facility].    Specialty:  DME Provider    Why:  DME    Contact information:    [address] [place] [address]  [phone]        Referrals     Future Orders    Referral to Home health         Discharge Instructions     Future Orders    Activity as tolerated     Call MD for:  increased confusion or weakness     Call MD for:  severe uncontrolled pain     Call MD for:  temperature >100.4     Diet Diabetic 2000 Calories     HOSPITAL BED FOR HOME USE     Questions:    Type:  Semi-electric    Length of need (1-99 months):  99    Does patient have medical equipment at home?:  glucometer    cane, quad    rollator    Height:  6' (1.829 m)    Weight:  95.3 kg (210 lb)    Accessories:      Please check all that apply:  Patient requires the head of bed to be elevated more than 30 degrees most of the time due to congestive heart failure, chronic pulmonary disease, or aspiration.  Pillows and wedges have been considered and ruled out.    NEBULIZER FOR HOME USE     Questions:    Height:  6' (1.829 m)    Weight:  95.3 kg (210 lb)    Does patient have medical equipment at home?:  rollator    cane, quad    Length of need (1-99 months):  99    NEBULIZER KIT (SUPPLIES) FOR HOME USE     Questions:    Height:  6' (1.829 m)    Weight:  95.3 kg (210 lb)    Does patient have medical equipment at home?:  rollator    cane, quad    Length of need (1-99 months):  99    Mask or Mouthpiece?:  Mask    WHEELCHAIR FOR HOME USE     Questions:    Hours in W/C per day:  6    Type of Wheelchair:  Standard    Size(Width):  18"(STD adult)    Leg Support:  STD footrests    Arm Height:      Desired seat depth:      Back height:      Lower leg length:      " Actual seat depth:      Lap Belt:  Velcro    Accessories:      Cushion:  Gel    Justification for cushion:      Height:  6' (1.829 m)    Weight:  95.3 kg (210 lb)    Does patient have medical equipment at home?:  rollator    cane, quad    Length of need (1-99 months):  99    Please check all that apply:  Patient mobility limitations cannot be sufficiently resolved by the use of other ambulatory therapies.    The patient requires the use of a w/c for activities of daily living within the Home.        Primary Diagnosis     Your primary diagnosis was:  Asthma Flare      Admission Information     Date & Time Provider Department CSN    2/7/2017  7:25 PM Severo Corrigan MD [facility] [id number]      Care Providers     Provider Role Specialty Primary office phone    Severo Corrigan MD Attending Provider Hospitalist [phone]    Rosendo Davis MD Consulting Physician  Cardiology [phone]    Marielle Moran NP Consulting Provider  Cardiology  [phone]      Important Medicare Message          Most Recent Value    Important Message from Medicare Regarding Discharge Appeal Rights  Explained to patient/caregiver, Signed/date by patient/caregiver yes 02/16/2017 1130      Your Vitals Were     BP Pulse Temp Resp Height Weight    157/68 (BP Location: Left arm) 69 97.4 °F (36.3 °C) (Oral) 18 6' (1.829 m) 95.3 kg (210 lb)    SpO2 BMI             92% 28.48 kg/m2         Recent Lab Values        6/1/2011 11/21/2011 10/2/2012 1/23/2013 11/26/2013 2/26/2015 3/6/2016 2/8/2017      2:54 AM  9:46 AM  8:57 AM  6:30 PM  2:47 PM  9:20 AM  4:40 AM  4:22 AM    A1C 7.0 (H) 6.8 (H) 6.9 (H) 7.1 (H) 9.1 (H) 7.1 (H) 8.1 (H) 6.5 (H)    Comment for A1C at  6:30 PM on 1/23/2013:           Increased risk for diabetes: 5.7 - 6.4         Diabetes: >6.4         Glycemic control for adults with diabetes: <7.0**In order to standardize %HbA1c results worldwide, as of October 11, 2010,the %HbA1c is being calculated using the master  equation recommended in theconsensus statement adopted by the ADA (American Diabetes Assoc), EASD(European Assoc for the Study of Diabetes), IFCC (International Federationof Clinical Chemistry and Laboratory Medicine) and IDF (InternationalDiabetes Federation). Result units: %HgbA1c (DCCT/NGSP).In common with other methods, Hb A1C values may not accurately reflect meanblood glucose in patients with hemoglobin variants (HgbF, HgbS and HgbC).Any cause of shortened erythrocyte survival will reduce exposure oferythrocytes to glucose with a consequent decrease in HbA1c (%) values, eventhough the time-averaged blood glucose level may be elevated. Causes ofshortened erythrocyte lifetime might be hemolytic anemia or other hemolyticdiseases, homozygous sickle cell trait, pregnancy, recent significant orchronic blood loss, etc. Caution should be used when interpreting the [id number] from patients with these conditions.    Comment for A1C at  4:22 AM on 2/8/2017:  According to ADA guidelines, hemoglobin A1C <7.0% represents  optimal control in non-pregnant diabetic patients.  Different  metrics may apply to specific populations.   Standards of Medical Care in Diabetes - 2016.  For the purpose of screening for the presence of diabetes:  <5.7%     Consistent with the absence of diabetes  5.7-6.4%  Consistent with increasing risk for diabetes   (prediabetes)  >or=6.5%  Consistent with diabetes  Currently no consensus exists for use of hemoglobin A1C  for diagnosis of diabetes for children.        Allergies as of 2/16/2017        Reactions    Lisinopril     Cough    Penicillins     Unknown      Advance Directives     An advance directive is a document which, in the event you are no longer able to make decisions for yourself, tells your healthcare team what kind of treatment you do or do not want to receive, or who you would like to make those decisions for you.  If you do not currently have an advance directive, [facility]  encourages you to create one.  For more information call:  [phone]), [phone] ([phone]),  or log on to [email or web address].        Smoking Cessation     If you would like to quit smoking:   You may be eligible for free services if you are a [place] resident and started smoking cigarettes before September 1, 1988.  Call the Smoking Cessation Trust ([facility]) toll free at ([phone]) [phone] or (364) 175-3281.   Call 5-300-QUIT-NOW if you do not meet the above criteria.            Language Assistance Services     ATTENTION: Language assistance services are available, free of charge. Please call [phone].      ATENCIÓN: Si gustabo natarajan, tiene a otto disposición servicios gratuitos de asistencia lingüística. Llame al [phone].     [facility] Ý: N?u b?n nói Ti?ng Vi?t, có các d?ch v? h? tr? ngôn ng? mi?n phí dành cho b?n. G?i s? [phone].        Heart Failure Education       Heart Failure: Being Active  You have a condition called heart failure. Being active doesnt mean that you have to wear yourself out. Even a little movement each day helps to strengthen your heart. If you cant get out to exercise, you can do simple stretching and strengthening exercises at home. These are good ways to keep you well-conditioned and prevent you and your heart from becoming excessively weak.    Ideas to get you started  · Add a little movement to things you do now. Walk to mail letters. Park your car at the far end of the parking lot and walk to the store. Walk up a flight of stairs instead of taking the elevator.  · Choose activities you enjoy. You might walk, swim, or ride an exercise bike. Things like gardening and washing the car count, too. Other possibilities include: washing dishes, walking the dog, walking around the mall, and doing aerobic activities with friends.  · Join a group exercise program at a [facility] or [facility], a senior center, or a community center. Or look into a hospital cardiac  rehabilitation program. Ask your doctor if you qualify.  Tips to keep you going  · Get up and get dressed each day. Go to a coffee shop and read a newspaper or go somewhere that you'll be in the presence of other active people. Youll feel more like being active.  · Make a plan. Choose one or more activities that you enjoy and that you can easily do. Then plan to do at least one each day. You might write your plan on a calendar.  · Go with a friend or a group if you like company. This can help you feel supported and stay motivated, too.  · Plan social events that you enjoy. This will keep you mentally engaged as well as physically motivated to do things you find pleasure in.  For your safety  · Talk with your healthcare provider before starting an exercise program.  · Exercise indoors when its too hot or too cold outside, or when the air quality is poor. Try walking at a shopping mall.  · Wear socks and sturdy shoes to maintain your balance and prevent falls.  · Start slowly. Do a few minutes several times a day at first. Increase your time and speed little by little.  · Stop and rest whenever you feel tired or get short of breath.  · Dont push yourself on days when you dont feel well.  Date Last Reviewed: 3/20/2016  © 9061-9071 [employer]. [address] [place] [address], [place] [address]. All rights reserved. This information is not intended as a substitute for professional medical care. Always follow your healthcare professional's instructions.              Heart Failure: Evaluating Your Heart  You have a condition called heart failure. To evaluate your condition, your doctor will examine you, ask questions, and do some tests. Along with looking for signs of heart failure, the doctor looks for any other health problems that may have led to heart failure. The results of your evaluation will help your doctor form a treatment plan.  Health history and physical exam  Your visit will start with a health  history. Tell the doctor about any symptoms youve noticed and about all medicines you take. Then youll have a physical exam. This includes listening to your heartbeat and breathing. Youll also be checked for swelling (edema) in your legs and neck. When you have fluid buildup or fluid in the lungs, it may be called congestive heart failure.  Diagnosing heart failure     During an echocardiogram, sound waves bounce off the heart. These are converted into a picture on the screen.   The following may be done to help your doctor form a diagnosis:  · X-rays show the size and shape of your heart. These pictures can also show fluid in your lungs.  · An electrocardiogram (ECG or EKG) shows the pattern of your heartbeat. Small pads (electrodes) are placed on your chest, arms, and legs. Wires connect the pads to the ECG machine, which records your hearts electrical signals. This can give the doctor information about heart function.  · An echocardiogram uses ultrasound waves to show the structure and movement of your heart muscle. This shows how well the heart pumps. It also shows the thickness of the heart walls, and if the heart is enlarged. It is one of the most useful, non-invasive tests as it provides information about the heart's general function. This helps your doctor make treatment decisions.  · Lab tests evaluate small amounts of blood or urine for signs of problems. A BNP lab test can help diagnose and evaluate heart failure. BNP stands for B-type natriuretic peptide. The ventricles secrete more BNP when heart failure worsens. Lab tests can also provide information about metabolic dysfunction or heart dysfunction.  Your treatment plan  Based on the results of your evaluation and tests, your doctor will develop a treatment plan. This plan is designed to relieve some of your heart failure symptoms and help make you more comfortable. Your treatment plan may include:  · Medicine to help your heart work better and  improve your quality of life  · Changes in what you eat and drink to help prevent fluid from backing up in your body  · Daily monitoring of your weight and heart failure symptoms to see how well your treatment plan is working  · Exercise to help you stay healthy  · Help with quitting smoking  · Emotional and psychological support to help adjust to the changes  · Referrals to other specialists to make sure you are being treated comprehensively  Date Last Reviewed: 3/21/2016  © 9389-0648 [employer]. [address] [place] [address], [place] [address]. All rights reserved. This information is not intended as a substitute for professional medical care. Always follow your healthcare professional's instructions.              Heart Failure: Making Changes to Your Diet  You have a condition called heart failure. When you have heart failure, excess fluid is more likely to build up in your body because your heart isn't working well. This makes the heart work harder to pump blood. Fluid buildup causes symptoms such as shortness of breath and swelling (edema). This is often referred to as congestive heart failure or CHF. Controlling the amount of salt (sodium) you eat may help stop fluid from building up. Your doctor may also tell you to reduce the amount of fluid you drink.  Reading food labels    Your healthcare provider will tell you how much sodium you can eat each day. Read food labels to keep track. Keep in mind that certain foods are high in salt. These include canned, frozen, and processed foods. Check the amount of sodium in each serving. Watch out for high-sodium ingredients. These include MSG (monosodium glutamate), baking soda, and sodium phosphate.   Eating less salt  Give yourself time to get used to eating less salt. It may take a little while. Here are some tips to help:  · Take the saltshaker off the table. Replace it with salt-free herb mixes and spices.  · Eat fresh or plain frozen vegetables. These have  much less salt than canned vegetables.  · Choose low-sodium snacks like sodium-free pretzels, crackers, or air-popped popcorn.  · Dont add salt to your food when youre cooking. Instead, season your foods with pepper, lemon, garlic, or onion.  · When you eat out, ask that your food be cooked without added salt.  · Avoid eating fried foods as these often have a great deal of salt.  If youre told to limit fluids  You may need to limit how much fluid you have to help prevent swelling. This includes anything that is liquid at room temperature, such as ice cream and soup. If your doctor tells you to limit fluid, try these tips:  · Measure drinks in a measuring cup before you drink them. This will help you meet daily goals.  · Chill drinks to make them more refreshing.  · Suck on frozen lemon wedges to quench thirst.  · Only drink when youre thirsty.  · Chew sugarless gum or suck on hard candy to keep your mouth moist.  · Weigh yourself daily to know if your body's fluid content is rising.  My sodium goal  Your healthcare provider may give you a sodium goal to meet each day. This includes sodium found in food as well as salt that you add. My goal is to eat no more than ___________ mg of sodium per day.     When to call your doctor  Call your doctor right away if you have any symptoms of worsening heart failure. These can include:  · Sudden weight gain  · Increased swelling of your legs or ankles  · Trouble breathing when youre resting or at night  · Increase in the number of pillows you have to sleep on  · Chest pain, pressure, discomfort, or pain in the jaw, neck, or back   Date Last Reviewed: 3/21/2016  © 4487-0188 [employer]. [address] [place] [address], [place] [address]. All rights reserved. This information is not intended as a substitute for professional medical care. Always follow your healthcare professional's instructions.              Heart Failure: Medicines to Help Your Heart    You have a  condition called heart failure (also known as congestive heart failure, or CHF). Your doctor will likely prescribe medicines for heart failure and any underlying health problems you have. Most heart failure patients take one or more types of medicinen. Your healthcare provider will work to find the combination of medicines that works best for you.  Heart failure medicines  Here are the most common heart failure medicines:  · ACE inhibitors lower blood pressure and decrease strain on the heart. This makes it easier for the heart to pump. Angiotensin receptor blockers have similar effects. These are prescribed for some patients instead of ACE inhibitors.  · Beta-blockers relieve stress on the heart. They also improve symptoms. They may also improve the heart's pumping action over time.  · Diuretics (also called water pills) help rid your body of excess water. This can help rid your body of swelling (edema). Having less fluid to pump means your heart doesnt have to work as hard. Some diuretics make your body lose a mineral called potassium. Your doctor will tell you if you need to take supplements or eat more foods high in potassium.  · Digoxin helps your heart pump with more strength. This helps your heart pump more blood with each beat. So, more oxygen-rich blood travels to the rest of the body.  · Aldosterone antagonists help alter hormones and decrease strain on the heart.  · Hydralazine and nitrates are two separate medicines used together to treat heart failure. They may come in one combination pill. They lower blood pressure and decrease how hard the heart has to pump.  Medicines for related conditions  Controlling other heart problems helps keep heart failure under control, too. Depending on other heart problems you have, medicines may be prescribed to:  · Lower blood pressure (antihypertensives).  · Lower cholesterol levels (statins).  · Prevent blood clots (anticoagulants or aspirin).  · Keep the  heartbeat steady (antiarrhythmics).  Date Last Reviewed: 3/5/2016  © 1630-2978 [employer]. [address] [place] [address], [place] [address]. All rights reserved. This information is not intended as a substitute for professional medical care. Always follow your healthcare professional's instructions.              Heart Failure: Procedures That May Help    The heart is a muscle that pumps oxygen-rich blood to all parts of the body. When you have heart failure, the heart is not able to pump as well as it should. Blood and fluid may back up into the lungs (congestive heart failure), and some parts of the body dont get enough oxygen-rich blood to work normally. These problems lead to the symptoms of heart failure.     Certain procedures may help the heart pump better in some cases of heart failure. Some procedures are done to treat health problems that may have caused the heart failure such as coronary artery disease or heart rhythm problems. For more serious heart failure, other options are available.  Treating artery and valve problems  If you have coronary artery disease or valve disease, procedures may be done to improve blood flow. This helps the heart pump better, which can improve heart failure symptoms. First, your doctor may do a cardiac catheterization to help detect clogged blood vessels or valve damage. During this procedure, a  thin tube (catheter) in inserted into a blood vessel and guided to the heart. There a dye is injected and a special type of X-ray (angiogram) is taken of the blood vessels. Procedures to open a blocked artery or fix damaged valves can also be done using catheterization.  · Angioplasty uses a balloon-tipped instrument at the end of the catheter. The balloon is inflated to widen the narrowed artery. In many cases, a stent is expanded to further support the narrowed artery. A stent is a metal mesh tube.  · Valve surgery repairs or replacement of faulty valves can also be done  during catheterization so blood can flow properly through the chambers of the heart.  Bypass surgery is another option to help treat blocked arteries. It uses a healthy blood vessel from elsewhere in the body. The healthy blood vessel is attached above and below the blocked area so that blood can flow around the blocked artery.  Treating heart rhythm problems  A device may be placed in the chest to help a weak heart maintain a healthy, heartbeat so the heart can pump more effectively:  · Pacemaker. A pacemaker is an implanted device that regulates your heartbeat electronically. It monitors your heart's rhythm and generates a painless electric impulse that helps the heart beat in a regular rhythm. A pacemaker is programmed to meet your specific heart rhythm needs.  · Biventricular pacing/cardiac resynchronization therapy. A type of pacemaker that paces both pumping chambers of the heart at the same time to coordinate contractions and to improve the heart's function. Some people with heart failure are candidates for this therapy.  · Implantable cardioverter defibrillator. A device similar to a pacemaker that senses when the heart is beating too fast and delivers an electrical shock to convert the fast rhythm to a normal rhythm. This can be a life saving device.  In severe cases  In more serious cases of heart failure when other treatments no longer work, other options may include:  · Ventricular assist devices (VADs). These are mechanical devices used to take over the pumping function for one or both of the heart's ventricles, or pumping chambers. A VAD may be necessary when heart failure progresses to the point that medicines and other treatments no longer help. In some cases, a VAD may be used as a bridge to transplant.  · Heart transplant. This is replacing the diseased heart with a healthy one from a donor. This is an option for a few people who are very sick. A heart transplant is very serious and not an option  for all patients. Your doctor can tell you more.  Date Last Reviewed: 3/20/2016  © 4969-5622 [employer]. [address] [place] [address], [place] [address]. All rights reserved. This information is not intended as a substitute for professional medical care. Always follow your healthcare professional's instructions.              Heart Failure: Tracking Your Weight  You have a condition called heart failure. When you have heart failure, a sudden weight gain or a steady rise in weight is a warning sign that your body is retaining too much water and salt. This could mean your heart failure is getting worse. If left untreated, it can cause problems for your lungs and result in shortness of breath. Weighing yourself each day is the best way to know if youre retaining water. If your weight goes up quickly, call your doctor. You will be given instructions on how to get rid of the excess water. You will likely need medicines and to avoid salt. This will help your heart work better.  Call your doctor if you gain more than 2 pounds in 1 day, more than 5 pounds in 1 week, or whatever weight gain you were told to report by your doctor. This is often a sign of worsening heart failure and needs to be evaluated and treated. Your doctor will tell you what to do next.   Tips for weighing yourself    · Weigh yourself at the same time each morning, wearing the same clothes. Weigh yourself after urinating and before eating.  · Use the same scale each day. Make sure the numbers are easy to read. Put the scale on a flat, hard surface -- not on a rug or carpet.  · Do not stop weighing yourself. If you forget one day, weigh again the next morning.  How to use your weight chart  · Keep your weight chart near the scale. Write your weight on the chart as soon as you get off the scale.  · Fill in the month and the start date on the chart. Then write down your weight each day. Your chart will look like this:    · If you miss a day, leave  the space blank. Weigh yourself the next day and write your weight in the next space.  · Take your weight chart with you when you go to see your doctor.  Date Last Reviewed: 3/20/2016  © 7139-1466 [employer]. [address] [place] [address], [place] [address]. All rights reserved. This information is not intended as a substitute for professional medical care. Always follow your healthcare professional's instructions.              Heart Failure: Warning Signs of a Flare-Up  You have a condition called heart failure. Once you have heart failure, flare-ups can happen. Below are signs that can mean your heart failure is getting worse. If you notice any of these warning signs, call your healthcare provider.  Swelling    · Your feet, ankles, or lower legs get puffier.  · You notice skin changes on your lower legs.  · Your shoes feel too tight.  · Your clothes are tighter in the waist.  · You have trouble getting rings on or off your fingers.  Shortness of breath  · You have to breathe harder even when youre doing your normal activities or when youre resting.  · You are short of breath walking up stairs or even short distances.  · You wake up at night short of breath or coughing.  · You need to use more pillows or sit up to sleep.  · You wake up tired or restless.  Other warning signs  · You feel weaker, dizzy, or more tired.  · You have chest pain or changes in your heartbeat.  · You have a cough that wont go away.  · You cant remember things or dont feel like eating.  Tracking your weight  Gaining weight is often the first warning sign that heart failure is getting worse. Gaining even a few pounds can be a sign that your body is retaining excess water and salt. Weighing yourself each day in the morning after you urinate and before you eat, is the best way to know if you're retaining water. Get a scale that is easy to read and make sure you wear the same clothes and use the same scale every time you weigh. Your  healthcare provider will show you how to track your weight. Call your doctor if you gain more than 2 pounds in 1 day, 5 pounds in 1 week, or whatever weight gain you were told to report by your doctor. This is often a sign of worsening heart failure and needs to be evaluated and treated before it compromises your breathing. Your doctor will tell you what to do next.    Date Last Reviewed: 3/15/2016  © 2655-5296 [employer]. [address] [place] [address], [place] [address]. All rights reserved. This information is not intended as a substitute for professional medical care. Always follow your healthcare professional's instructions.              Chronic Kindey Disease Education             Diabetes Discharge Instructions                                    [facility] complies with applicable Federal civil rights laws and does not discriminate on the basis of race, color, national origin, age, disability, or sex.

## 2017-02-08 LAB
ESTIMATED AVG GLUCOSE: 140 MG/DL
HBA1C MFR BLD HPLC: 6.5 %
POCT GLUCOSE: 119 MG/DL (ref 70–110)
POCT GLUCOSE: 250 MG/DL (ref 70–110)
POCT GLUCOSE: 279 MG/DL (ref 70–110)
POCT GLUCOSE: 310 MG/DL (ref 70–110)

## 2017-02-08 PROCEDURE — 63600175 PHARM REV CODE 636 W HCPCS: Performed by: EMERGENCY MEDICINE

## 2017-02-08 PROCEDURE — 25000003 PHARM REV CODE 250: Performed by: EMERGENCY MEDICINE

## 2017-02-08 PROCEDURE — 94640 AIRWAY INHALATION TREATMENT: CPT

## 2017-02-08 PROCEDURE — 11000001 HC ACUTE MED/SURG PRIVATE ROOM

## 2017-02-08 PROCEDURE — 94761 N-INVAS EAR/PLS OXIMETRY MLT: CPT

## 2017-02-08 PROCEDURE — 83036 HEMOGLOBIN GLYCOSYLATED A1C: CPT

## 2017-02-08 PROCEDURE — 36415 COLL VENOUS BLD VENIPUNCTURE: CPT

## 2017-02-08 PROCEDURE — 27000221 HC OXYGEN, UP TO 24 HOURS

## 2017-02-08 PROCEDURE — 99223 1ST HOSP IP/OBS HIGH 75: CPT | Mod: ,,, | Performed by: INTERNAL MEDICINE

## 2017-02-08 PROCEDURE — 25000242 PHARM REV CODE 250 ALT 637 W/ HCPCS: Performed by: EMERGENCY MEDICINE

## 2017-02-08 RX ORDER — HALOPERIDOL 5 MG/ML
5 INJECTION INTRAMUSCULAR EVERY 6 HOURS PRN
Status: DISCONTINUED | OUTPATIENT
Start: 2017-02-08 | End: 2017-02-16 | Stop reason: HOSPADM

## 2017-02-08 RX ADMIN — RIVASTIGMINE TRANSDERMAL SYSTEM 1 PATCH: 9.5 PATCH, EXTENDED RELEASE TRANSDERMAL at 10:02

## 2017-02-08 RX ADMIN — QUETIAPINE FUMARATE 50 MG: 25 TABLET, FILM COATED ORAL at 08:02

## 2017-02-08 RX ADMIN — METHYLPREDNISOLONE SODIUM SUCCINATE 80 MG: 125 INJECTION, POWDER, FOR SOLUTION INTRAMUSCULAR; INTRAVENOUS at 12:02

## 2017-02-08 RX ADMIN — IPRATROPIUM BROMIDE AND ALBUTEROL SULFATE 3 ML: .5; 3 SOLUTION RESPIRATORY (INHALATION) at 12:02

## 2017-02-08 RX ADMIN — INSULIN DETEMIR 15 UNITS: 100 INJECTION, SOLUTION SUBCUTANEOUS at 10:02

## 2017-02-08 RX ADMIN — IPRATROPIUM BROMIDE AND ALBUTEROL SULFATE 3 ML: .5; 3 SOLUTION RESPIRATORY (INHALATION) at 07:02

## 2017-02-08 RX ADMIN — VALSARTAN 160 MG: 80 TABLET ORAL at 10:02

## 2017-02-08 RX ADMIN — METHYLPREDNISOLONE SODIUM SUCCINATE 80 MG: 125 INJECTION, POWDER, FOR SOLUTION INTRAMUSCULAR; INTRAVENOUS at 09:02

## 2017-02-08 RX ADMIN — INSULIN ASPART 4 UNITS: 100 INJECTION, SOLUTION INTRAVENOUS; SUBCUTANEOUS at 12:02

## 2017-02-08 RX ADMIN — INSULIN ASPART 3 UNITS: 100 INJECTION, SOLUTION INTRAVENOUS; SUBCUTANEOUS at 05:02

## 2017-02-08 RX ADMIN — QUETIAPINE FUMARATE 50 MG: 25 TABLET, FILM COATED ORAL at 10:02

## 2017-02-08 RX ADMIN — IPRATROPIUM BROMIDE AND ALBUTEROL SULFATE 3 ML: .5; 3 SOLUTION RESPIRATORY (INHALATION) at 01:02

## 2017-02-08 RX ADMIN — CLOPIDOGREL BISULFATE 75 MG: 75 TABLET ORAL at 10:02

## 2017-02-08 RX ADMIN — DULOXETINE 60 MG: 30 CAPSULE, DELAYED RELEASE ORAL at 10:02

## 2017-02-08 RX ADMIN — METHYLPREDNISOLONE SODIUM SUCCINATE 80 MG: 125 INJECTION, POWDER, FOR SOLUTION INTRAMUSCULAR; INTRAVENOUS at 06:02

## 2017-02-08 RX ADMIN — SIMVASTATIN 40 MG: 40 TABLET, FILM COATED ORAL at 08:02

## 2017-02-08 RX ADMIN — ISOSORBIDE MONONITRATE 120 MG: 60 TABLET, EXTENDED RELEASE ORAL at 10:02

## 2017-02-08 RX ADMIN — SOTALOL HYDROCHLORIDE 120 MG: 80 TABLET ORAL at 10:02

## 2017-02-08 RX ADMIN — INSULIN ASPART 1 UNITS: 100 INJECTION, SOLUTION INTRAVENOUS; SUBCUTANEOUS at 08:02

## 2017-02-08 RX ADMIN — METHYLPREDNISOLONE SODIUM SUCCINATE 80 MG: 125 INJECTION, POWDER, FOR SOLUTION INTRAMUSCULAR; INTRAVENOUS at 03:02

## 2017-02-08 RX ADMIN — PANTOPRAZOLE SODIUM 40 MG: 40 TABLET, DELAYED RELEASE ORAL at 10:02

## 2017-02-08 NOTE — NURSING
PT ordered per family request. Attempted x 2 to call PT dept. No answer either time. No beeper available

## 2017-02-08 NOTE — PLAN OF CARE
Patient has been living alone with 24/7 sitters since his spouse moved in to [facility] last month.  Pt's daughter Deisy stated that patient will discharge to [facility] from the hospital.  Pt's spouse has [facility] at [facility] therefore when patient discharges family would like [facility] for patient.  Family is requesting that a hospital bed be ordered on discharge.  MPOA is Dr Barrow.       02/08/17 9161   Discharge Assessment   Assessment Type Discharge Planning Assessment   Confirmed/corrected address and phone number on facesheet? Yes   Assessment information obtained from? Other  (daughter Deiys)   Type of Healthcare Directive Received Durable power of  for health care  ([facility] - Dr Rosendo Barrow)   Prior to hospitilization cognitive status: (with confusion)   Prior to hospitalization functional status: Assistive Equipment;Needs Assistance   Current cognitive status: (sleeping)   Current Functional Status: Assistive Equipment;Needs Assistance   Arrived From home or self-care   Lives With alone   Able to Return to Prior Arrangements no   Is patient able to care for self after discharge? No   Patient's perception of discharge disposition assisted living   Readmission Within The Last 30 Days no previous admission in last 30 days   Patient currently receives home health services? No   Does the patient currently use HME? Yes   Patient currently receives private duty nursing? No   Patient currently receives any other outside agency services? Yes  (patient has a sitter 24/7)   Equipment Currently Used at Home glucometer;cane, quad;rollator   Do you have any problems affording any of your prescribed medications? No   Is the patient taking medications as prescribed? yes   Do you have any financial concerns preventing you from receiving the healthcare you need? No   Does the patient have transportation to healthcare appointments? Yes   Transportation Available family or friend will provide   On  Dialysis? No   Does the patient receive services at the [facility]? No   Discharge Plan A Assisted Living;Home Health   Patient/Family In Agreement With Plan yes

## 2017-02-08 NOTE — ED NOTES
Presents to the ER with c/o cough that has been persistent for the past 4 days. Family reports that they were told patient has had increased weakness today with SOB. Associated complaints are nausea. Mucous membranes are pink and moist. Skin is warm, dry and intact. Lungs bilateral expiratory wheezing, respirations are regular and unlabored. Denies congestion or rhinorrhea. Cough in non productive. BS active x4, no tenderness with palpation, abd is soft and not distended. Denies any appetite or activity change. S1S2, capillary refill is < 2 seconds. Denies dysuria, difficulty urinating, frequency, numbness, tingling or weakness. BETTIE JORDANS

## 2017-02-08 NOTE — SUBJECTIVE & OBJECTIVE
Past Medical History   Diagnosis Date    Asthma     CHF (congestive heart failure)     Coronary artery disease      6 stents    Dementia     Diabetes mellitus     Dyslipidemia     Hyperlipidemia     Hypertension     Pacemaker        Past Surgical History   Procedure Laterality Date    Coronary angioplasty with stent placement  pacemaker    Total hip arthroplasty  Feb 2013 Left    Hernia repair      Cardiac pacemaker placement      Colonoscopy N/A 3/28/2016     Procedure: COLONOSCOPY;  Surgeon: Del Ruvalcaba MD;  Location: [facility];  Service: Endoscopy;  Laterality: N/A;       Review of patient's allergies indicates:   Allergen Reactions    Lisinopril      Cough      Penicillins      Unknown         No current facility-administered medications on file prior to encounter.      Current Outpatient Prescriptions on File Prior to Encounter   Medication Sig    albuterol 90 mcg/actuation inhaler Inhale 2 puffs into the lungs every 6 (six) hours as needed for Wheezing. Rescue    clopidogrel (PLAVIX) 75 mg tablet take 1 tablet by mouth once daily    duloxetine (CYMBALTA) 60 MG capsule take 1 capsule by mouth once daily    ferrous gluconate (FERGON) 324 MG tablet Take 1 tablet (324 mg total) by mouth 2 (two) times daily with meals.    furosemide (LASIX) 20 MG tablet Take 20 mg by mouth daily as needed. For 3 days. Started 3/5/16    glyBURIDE (DIABETA) 5 MG tablet Take 1 tablet (5 mg total) by mouth once daily.    inhalation device (AEROCHAMBER PLUS FLOW-VU) Use as directed for inhalation.    insulin glargine (LANTUS SOLOSTAR) 100 unit/mL (3 mL) InPn pen Inject 15 Units into the skin once daily. inject 15 units subcutaneously once daily (Patient taking differently: Inject 20 Units into the skin once daily. inject 15 units subcutaneously once daily)    insulin lispro (HUMALOG KWIKPEN) 100 unit/mL InPn pen inject 5 units AT LUNCH TIME MEAL Use in addition to sliding scale    isosorbide mononitrate  "(IMDUR) 120 MG 24 hr tablet Take 1 tablet (120 mg total) by mouth once daily.    nitroGLYCERIN (NITROSTAT) 0.4 MG SL tablet Place 0.4 mg under the tongue every 5 (five) minutes as needed.      predniSONE (DELTASONE) 20 MG tablet Take 2 tablets daily x 5 days then 1 tablet daily x 5 days    quetiapine (SEROQUEL) 50 MG tablet Take 2 tabs in AM, 1/2 at noon, and 1every evening    rivastigmine (EXELON) 9.5 mg/24 hr PT24 apply 1 patch once daily    senna-docusate 8.6-50 mg (PERICOLACE) 8.6-50 mg per tablet Take 1 tablet by mouth 2 (two) times daily.    simvastatin (ZOCOR) 40 MG tablet take 1 tablet by mouth at bedtime    sotalol (BETAPACE) 120 MG Tab take 1 tablet by mouth twice a day    valsartan (DIOVAN) 160 MG tablet Take 1 tablet (160 mg total) by mouth once daily. Takes at night    VESICARE 5 mg tablet take 1 tablet by mouth once daily    azithromycin (ZITHROMAX Z-UNA) 250 MG tablet Use as directed    BD INSULIN PEN NEEDLE UF SHORT 31 gauge x 5/16" Ndle use as directed once daily    nitroGLYCERIN 0.4 MG/DOSE TL SPRY (NITROLINGUAL) 400 mcg/spray spray USE 1 TO 2 SPRAYS UNDER THE TONGUE IF NEEDED FOR CHEST PAINS    ONETOUCH ULTRA TEST Strp TEST three times a day    pantoprazole (PROTONIX) 40 MG tablet Take 1 tablet (40 mg total) by mouth once daily.    promethazine-codeine 6.25-10 mg/5 ml (PHENERGAN WITH CODEINE) 6.25-10 mg/5 mL syrup Take 5 mLs by mouth every 6 (six) hours as needed for Cough.     Family History     Problem Relation (Age of Onset)    Diabetes Mother        Social History Main Topics    Smoking status: Former Smoker     Years: 40.00     Quit date: 1/1/1952    Smokeless tobacco: Never Used    Alcohol use No    Drug use: No    Sexual activity: Not on file     Review of Systems   Constitution: Negative for chills, diaphoresis, weakness, malaise/fatigue and weight gain.   HENT: Negative for congestion and sore throat.    Cardiovascular: Positive for leg swelling (pedal edema ) and " orthopnea. Negative for chest pain, dyspnea on exertion, near-syncope, palpitations and syncope.   Respiratory: Positive for cough, shortness of breath and wheezing. Negative for hemoptysis.    Hematologic/Lymphatic: Does not bruise/bleed easily.   Skin: Negative for color change and rash.   Musculoskeletal: Negative for arthritis, back pain and joint swelling.   Gastrointestinal: Negative for abdominal pain, constipation, diarrhea, melena, nausea and vomiting.   Genitourinary: Negative for dysuria.   Neurological: Negative for dizziness, focal weakness, light-headedness, numbness and paresthesias.   Psychiatric/Behavioral: Negative for depression.        No change in mental status      Objective:     Vital Signs (Most Recent):  Temp: 98.1 °F (36.7 °C) (02/08/17 0423)  Pulse: 69 (02/08/17 0724)  Resp: 12 (02/08/17 0724)  BP: 124/85 (02/08/17 0423)  SpO2: (!) 92 % (02/08/17 0724) Vital Signs (24h Range):  Temp:  [97.3 °F (36.3 °C)-98.1 °F (36.7 °C)] 98.1 °F (36.7 °C)  Pulse:  [61-72] 69  Resp:  [12-28] 12  SpO2:  [92 %-98 %] 92 %  BP: (116-146)/(56-85) 124/85     Weight: 95.3 kg (210 lb)  Body mass index is 28.48 kg/(m^2).    SpO2: (!) 92 %  O2 Device (Oxygen Therapy): room air    No intake or output data in the 24 hours ending 02/08/17 0903    Lines/Drains/Airways     Peripheral Intravenous Line                 Peripheral IV - Single Lumen 02/07/17 2127 Right Hand less than 1 day                Physical Exam   Constitutional: He appears well-developed and well-nourished. No distress.   HENT:   Head: Normocephalic and atraumatic.   Eyes: Conjunctivae and EOM are normal. Right eye exhibits no discharge. Left eye exhibits no discharge. No scleral icterus.   Neck: Normal range of motion. Normal carotid pulses and no JVD present. Carotid bruit is not present.   Cardiovascular: Normal rate and regular rhythm.    Pulses:       Radial pulses are 2+ on the right side, and 2+ on the left side.        Dorsalis pedis pulses are  1+ on the right side, and 1+ on the left side.   Distant heart tones    Pulmonary/Chest: Effort normal. No respiratory distress. He has no wheezes.   Diminished sounds bilateral bases with poor inspiratory effort    Abdominal: Soft. Bowel sounds are normal. There is no tenderness. There is no guarding.   Musculoskeletal: Normal range of motion.   1 + pitting edema to right and left lower legs, no associated tenderness    Neurological: He is alert.   Oriented to person.    Skin: Skin is warm and dry. He is not diaphoretic. No cyanosis. Nails show no clubbing.   Psychiatric: He has a normal mood and affect. His behavior is normal. Cognition and memory are impaired.   Vitals reviewed.      Significant Labs:   CMP   Recent Labs  Lab 02/07/17 1949      K 4.9      CO2 25   *   BUN 43*   CREATININE 2.4*   CALCIUM 9.3   PROT 6.2   ALBUMIN 2.9*   BILITOT 0.3   ALKPHOS 64   AST 13   ALT 14   ANIONGAP 10   ESTGFRAFRICA 27*   EGFRNONAA 23*   , CBC   Recent Labs  Lab 02/07/17 1949   WBC 13.00*   HGB 11.8*   HCT 36.8*   *    and Troponin   Recent Labs  Lab 02/07/17 1949   TROPONINI 0.017       Significant Imaging:   CXR 02/07/2017:  Findings: A left-sided dual-lead cardiac pacer is present.  Ours is normal.  There is calcification of the aorta.  Lung lungs are moderately low.  No consolidation or pleural effusion.  A round retrocardiac opacity is present compatible with a hiatal hernia.    Impression: Moderately low lung volumes without acute process.  Left cardiac pacer.  Hiatal hernia.    Prior Echo 03/2015:  CONCLUSIONS     1 - Concentric remodeling.     2 - Normal left ventricular systolic function (EF 55-60%).     3 - Left ventricular diastolic dysfunction.     4 - Normal right ventricular systolic function .     5 - Trivial aortic regurgitation.     6 - Intermediate central venous pressure.     Prior NM Stress Test/Lexiscan 03/2013:  EKG Conclusions:  1. The EKG portion of this study is  uninterpretable for ischemia at a peak heart rate of 95 bpm (69% of predicted).   2. Blood pressure remained stable throughout the protocol  (Presenting BP: 124/67 Peak BP: 149/65).   3. No significant arrhythmias were present.   4. The patient reported dyspnea during the protocol which resolved in recovery.       Nuclear Quantitative Functional Analysis:   LVEF: 58 % (normal is 47 - 59)  LVED Volume: 125 ml (normal is 91 - 155)  LVES Volume: 52 ml (normal is 40 - 78)    Impression: ABNORMAL MYOCARDIAL PERFUSION  1. There is moderate intensity fixed defects in the apical and inferoseptal walls of the left ventricle, consistent with myocardial injury. There is trivial dell-injury ischemia.   2. There is abnormal wall motion at rest showing moderate hypokinesis of the apical and inferoseptal walls of the left ventricle.   3. Resting LV function is normal.  (normal is 47 - 59)  4. The ventricular volumes are normal at rest and stress.   5. The extracardiac distribution of radioactivity is normal.   6. There was no previous study available to compare.

## 2017-02-08 NOTE — ASSESSMENT & PLAN NOTE
IV steroids  duonebs  No pna appreciated by my CXR read, recommend attending to add abx if deemed necessary  Elevate HOB

## 2017-02-08 NOTE — H&P
[facility] Hospital Medicine  History & Physical    Patient Name: Thierry Fu  MRN: [id number]  Admission Date: 2/7/2017  Attending Physician: Stacy Figueroa MD   Primary Care Provider: Rosendo Barrow Jr, MD         Patient information was obtained from relative(s), past medical records and ER records.     Subjective:     Principal Problem:Mild intermittent asthma with acute exacerbation    Chief Complaint: cough, wheezing and SOB exacerbated with lying down X 10 days. No improvement after inh and po steroids over the past three days.   Chief Complaint   Patient presents with    Cough     cough and sob worse when lying down        HPI: Mr. Fu is an 88 yo male  admitted to the services of hospital medicine via the ER for asthma exacerbation. Unable to obtain history and ROS from pt due to dementia. History obtained from ER physician, family and previous epic records. Presents with CC of non-productive cough x 10 days. Associated with wheezing, SOB which are exacerbated by lying down. Started bronchodilator for wheezing and corticosteroids 3 days ago. No improvement noted. Remote history of smoking, quit 60 years ago. History of CAD and PPM. Diabetes type II controlled on oral medications.  Other past medical history as listed below.     Past Medical History   Diagnosis Date    Asthma     CHF (congestive heart failure)     Coronary artery disease      6 stents    Dementia     Diabetes mellitus     Dyslipidemia     Hyperlipidemia     Hypertension     Pacemaker        Past Surgical History   Procedure Laterality Date    Coronary angioplasty with stent placement  pacemaker    Total hip arthroplasty  Feb 2013 Left    Hernia repair      Cardiac pacemaker placement      Colonoscopy N/A 3/28/2016     Procedure: COLONOSCOPY;  Surgeon: Del Ruvalcaba MD;  Location: [facility];  Service: Endoscopy;  Laterality: N/A;       Review of patient's allergies indicates:    Allergen Reactions    Lisinopril      Cough      Penicillins      Unknown         No current facility-administered medications on file prior to encounter.      Current Outpatient Prescriptions on File Prior to Encounter   Medication Sig    albuterol 90 mcg/actuation inhaler Inhale 2 puffs into the lungs every 6 (six) hours as needed for Wheezing. Rescue    clopidogrel (PLAVIX) 75 mg tablet take 1 tablet by mouth once daily    duloxetine (CYMBALTA) 60 MG capsule take 1 capsule by mouth once daily    ferrous gluconate (FERGON) 324 MG tablet Take 1 tablet (324 mg total) by mouth 2 (two) times daily with meals.    furosemide (LASIX) 20 MG tablet Take 20 mg by mouth daily as needed. For 3 days. Started 3/5/16    glyBURIDE (DIABETA) 5 MG tablet Take 1 tablet (5 mg total) by mouth once daily.    inhalation device (AEROCHAMBER PLUS FLOW-VU) Use as directed for inhalation.    insulin glargine (LANTUS SOLOSTAR) 100 unit/mL (3 mL) InPn pen Inject 15 Units into the skin once daily. inject 15 units subcutaneously once daily (Patient taking differently: Inject 20 Units into the skin once daily. inject 15 units subcutaneously once daily)    insulin lispro (HUMALOG KWIKPEN) 100 unit/mL InPn pen inject 5 units AT LUNCH TIME MEAL Use in addition to sliding scale    isosorbide mononitrate (IMDUR) 120 MG 24 hr tablet Take 1 tablet (120 mg total) by mouth once daily.    nitroGLYCERIN (NITROSTAT) 0.4 MG SL tablet Place 0.4 mg under the tongue every 5 (five) minutes as needed.      predniSONE (DELTASONE) 20 MG tablet Take 2 tablets daily x 5 days then 1 tablet daily x 5 days    quetiapine (SEROQUEL) 50 MG tablet Take 2 tabs in AM, 1/2 at noon, and 1every evening    rivastigmine (EXELON) 9.5 mg/24 hr PT24 apply 1 patch once daily    senna-docusate 8.6-50 mg (PERICOLACE) 8.6-50 mg per tablet Take 1 tablet by mouth 2 (two) times daily.    simvastatin (ZOCOR) 40 MG tablet take 1 tablet by mouth at bedtime    sotalol  "(BETAPACE) 120 MG Tab take 1 tablet by mouth twice a day    valsartan (DIOVAN) 160 MG tablet Take 1 tablet (160 mg total) by mouth once daily. Takes at night    VESICARE 5 mg tablet take 1 tablet by mouth once daily    azithromycin (ZITHROMAX Z-UNA) 250 MG tablet Use as directed    BD INSULIN PEN NEEDLE UF SHORT 31 gauge x 5/16" Ndle use as directed once daily    nitroGLYCERIN 0.4 MG/DOSE TL SPRY (NITROLINGUAL) 400 mcg/spray spray USE 1 TO 2 SPRAYS UNDER THE TONGUE IF NEEDED FOR CHEST PAINS    ONETOUCH ULTRA TEST Strp TEST three times a day    pantoprazole (PROTONIX) 40 MG tablet Take 1 tablet (40 mg total) by mouth once daily.    promethazine-codeine 6.25-10 mg/5 ml (PHENERGAN WITH CODEINE) 6.25-10 mg/5 mL syrup Take 5 mLs by mouth every 6 (six) hours as needed for Cough.     Family History     Problem Relation (Age of Onset)    Diabetes Mother        Social History Main Topics    Smoking status: Former Smoker     Years: 40.00     Quit date: 1/1/1952    Smokeless tobacco: Never Used    Alcohol use No    Drug use: No    Sexual activity: Not on file     Review of Systems   Unable to perform ROS: Dementia     Objective:     Vital Signs (Most Recent):  Temp: 98.1 °F (36.7 °C) (02/08/17 0423)  Pulse: 69 (02/08/17 0724)  Resp: 12 (02/08/17 0724)  BP: 124/85 (02/08/17 0423)  SpO2: (!) 92 % (02/08/17 0724) Vital Signs (24h Range):  Temp:  [97.3 °F (36.3 °C)-98.1 °F (36.7 °C)] 98.1 °F (36.7 °C)  Pulse:  [61-72] 69  Resp:  [12-28] 12  SpO2:  [92 %-98 %] 92 %  BP: (116-146)/(56-85) 124/85     Weight: 95.3 kg (210 lb)  Body mass index is 28.48 kg/(m^2).    Physical Exam   Constitutional: He appears well-developed and well-nourished. No distress.   Does not appear toxic    HENT:   Head: Normocephalic and atraumatic.   Mouth/Throat: Oropharynx is clear and moist. No oropharyngeal exudate.   Eyes: Conjunctivae are normal. Pupils are equal, round, and reactive to light. No scleral icterus.   Neck: No JVD present. No " tracheal deviation present. No thyromegaly present.   Cardiovascular: Normal rate and regular rhythm.  Exam reveals no gallop and no friction rub.    Murmur heard.  PPM left chest wall    Pulmonary/Chest: Effort normal. No respiratory distress. He has no wheezes. He has no rales. He exhibits no tenderness.   Coarse. BBS. No further wheezing at this time   Abdominal: Soft. Bowel sounds are normal. He exhibits no distension and no mass. There is no tenderness.   Musculoskeletal: Normal range of motion. He exhibits edema. He exhibits no tenderness.   Lymphadenopathy:     He has no cervical adenopathy.   Neurological: He displays normal reflexes.   Sleeping, awakes easily to tactile and verbal stimuli.    Skin: Skin is warm and dry. No erythema.   Psychiatric: He has a normal mood and affect. His behavior is normal. Judgment and thought content normal.   Vitals reviewed.       Significant Labs:   CBC:   Recent Labs  Lab 02/07/17 1949   WBC 13.00*   HGB 11.8*   HCT 36.8*   *     CMP:   Recent Labs  Lab 02/07/17 1949      K 4.9      CO2 25   *   BUN 43*   CREATININE 2.4*   CALCIUM 9.3   PROT 6.2   ALBUMIN 2.9*   BILITOT 0.3   ALKPHOS 64   AST 13   ALT 14   ANIONGAP 10   EGFRNONAA 23*       Significant Imaging: CXR reviewed by me-no acute process, poor inspiriation, PPM    Assessment/Plan:     Chronic kidney disease (CKD), stage III (moderate)  avoid nephrotoxins,  renal dose medications as needed   creatinine at baseline         * Mild intermittent asthma with acute exacerbation  IV steroids  duonebs  No pna appreciated by my CXR read, recommend attending to add abx if deemed necessary  Elevate HOB        Late onset Alzheimer's disease without behavioral disturbance  Chronic  Stable   Continue home medications  Nursing aware      PAF (paroxysmal atrial fibrillation)  See above        Pacemaker  Nursing reports unusual firing pattern by dual PPM  Clinically, heart tones-RRR. No evidence of heart  failure  Will order interrogation and evaluation by cardiology       Type 2 diabetes mellitus with circulatory disorder  Chronic  Hold oral agents  Expect BS to rise with steroids  Accuchecks with ISS  Order A1C      VTE Risk Mitigation         Ordered     Medium Risk of VTE  Once      02/07/17 6254        Sita Bravo NP  Department of Hospital Medicine   [facility]

## 2017-02-08 NOTE — PLAN OF CARE
Problem: Patient Care Overview  Goal: Plan of Care Review  Outcome: Ongoing (interventions implemented as appropriate)  No acute distress noted Safety maintained Will continue to monitor

## 2017-02-08 NOTE — PLAN OF CARE
02/08/17 0113   Patient Assessment/Suction   Level of Consciousness (AVPU) alert   Respiratory Effort Normal;Unlabored   Expansion/Accessory Muscles/Retractions expansion symmetric   All Lung Fields Breath Sounds diminished;clear   Cough Type nonproductive   PRE-TX-O2-ETCO2   O2 Device (Oxygen Therapy) room air   SpO2 95 %   Pulse 63   Resp 18   Aerosol Therapy   $ Aerosol Therapy Charges Aerosol Treatment   Respiratory Treatment Status given   SVN/Inhaler Treatment Route mask;with oxygen   Position During Treatment HOB at 30-45 degrees   Patient Tolerance good   Post-Treatment   Post-treatment Heart Rate (beats/min) 67   Post-treatment Resp Rate (breaths/min) 16   All Fields Breath Sounds clear;aeration increased

## 2017-02-08 NOTE — ED PROVIDER NOTES
Encounter Date: 2/7/2017    SCRIBE #1 NOTE: I, Delonte Sharpe, am scribing for, and in the presence of, Dr. Raygoza.       History     Chief Complaint   Patient presents with    Cough     cough and sob worse when lying down     Review of patient's allergies indicates:   Allergen Reactions    Lisinopril      Cough      Penicillins      Unknown       HPI Comments: 02/07/2017  7:34 PM     Chief Complaint: Cough      The patient is a 87 y.o. male with a PMHx of asthma; CHF; CAD; dementia; DM; dyslipidemia; HLD; HTN; and pacemaker who is presenting with a persistent cough that started about 10 days ago and progressively worsened. Pt seen here in ED 9 days ago with similar symptoms. His relatives reported that the pt tries to cough up mucous, but he is not able to. Associated symptoms of SOB and wheezing. His symptoms are exacerbated with lying down. His son in law reported calling in an inhaler for the pt's wheezing and a short course of steroids, which the pt is on day 3 of 20. He denied any antibiotics. His relatives denied any relief from the medications. His son in law also reported recent bilateral feet swelling, so he gave the pt a one time dose of 40 of lasix this morning. No fever. No CP. No hx of heart failure or lung disease. ROS provided by relatives and the pt. Pt has a past surgical history that includes Coronary angioplasty with stent; Total hip arthroplasty; Hernia repair; Cardiac pacemaker placement; and Colonoscopy.      The history is provided by the patient, a relative and medical records.     Past Medical History   Diagnosis Date    Asthma     CHF (congestive heart failure)     Coronary artery disease      6 stents    Dementia     Diabetes mellitus     Dyslipidemia     Hyperlipidemia     Hypertension     Pacemaker      No past medical history pertinent negatives.  Past Surgical History   Procedure Laterality Date    Coronary angioplasty with stent placement  pacemaker    Total hip  arthroplasty  Feb 2013 Left    Hernia repair      Cardiac pacemaker placement      Colonoscopy N/A 3/28/2016     Procedure: COLONOSCOPY;  Surgeon: Del Ruvalcaba MD;  Location: [facility];  Service: Endoscopy;  Laterality: N/A;     Family History   Problem Relation Age of Onset    Diabetes Mother      Social History   Substance Use Topics    Smoking status: Former Smoker     Years: 40.00     Quit date: 1/1/1952    Smokeless tobacco: Never Used    Alcohol use No     Review of Systems   Constitutional: Negative for fever.   HENT: Negative for sore throat.    Eyes: Negative for visual disturbance.   Respiratory: Positive for cough, shortness of breath and wheezing.    Cardiovascular: Positive for leg swelling (Bilateral feet swelling.). Negative for chest pain.   Gastrointestinal: Negative for nausea.   Genitourinary: Negative for dysuria.   Musculoskeletal: Negative for back pain.   Skin: Negative for rash.   Neurological: Negative for weakness.   Hematological: Does not bruise/bleed easily.       Physical Exam   Initial Vitals   BP Pulse Resp Temp SpO2   02/07/17 1931 02/07/17 1931 02/07/17 1931 02/07/17 1931 02/07/17 1931   146/65 70 28 97.3 °F (36.3 °C) 95 %     Physical Exam    Nursing note and vitals reviewed.  Constitutional: He appears well-developed and well-nourished.   HENT:   Head: Normocephalic and atraumatic.   Mouth/Throat: Oropharynx is clear and moist.   Eyes: Conjunctivae and EOM are normal. Pupils are equal, round, and reactive to light.   Neck: Neck supple.   Cardiovascular: Normal rate, regular rhythm and intact distal pulses. Exam reveals no gallop and no friction rub.    Murmur heard.   Systolic murmur is present with a grade of 1/6   Pulmonary/Chest: He has wheezes (Expiratory wheezing.).   Dullness to percussion in the bases.  Decreased air movement.   Abdominal: Soft. There is no hepatomegaly. There is no tenderness. There is no rebound and no guarding.   Musculoskeletal:   No  swelling in the feet.   Neurological: He is alert and oriented to person, place, and time.   Skin: No rash noted. No erythema.   Psychiatric: He has a normal mood and affect.         ED Course   Procedures  Labs Reviewed   PHOSPHORUS - Abnormal; Notable for the following:        Result Value    Phosphorus 5.2 (*)     All other components within normal limits   COMPREHENSIVE METABOLIC PANEL - Abnormal; Notable for the following:     Glucose 186 (*)     BUN, Bld 43 (*)     Creatinine 2.4 (*)     Albumin 2.9 (*)     eGFR if  27 (*)     eGFR if non  23 (*)     All other components within normal limits   CBC W/ AUTO DIFFERENTIAL - Abnormal; Notable for the following:     WBC 13.00 (*)     RBC 4.03 (*)     Hemoglobin 11.8 (*)     Hematocrit 36.8 (*)     Platelets 368 (*)     MPV 7.6 (*)     Gran # 11.2 (*)     Lymph # 0.9 (*)     Gran% 86.6 (*)     Lymph% 7.3 (*)     All other components within normal limits   D DIMER, QUANTITATIVE - Abnormal; Notable for the following:     D-Dimer 0.64 (*)     All other components within normal limits   B-TYPE NATRIURETIC PEPTIDE - Abnormal; Notable for the following:      (*)     All other components within normal limits   MAGNESIUM   TROPONIN I     EKG Readings: (Independently Interpreted)   Rhythm: Paced Rhythm. Ectopy: No Ectopy. Conduction: Normal. ST Segments: Normal ST Segments. T Waves: Normal. Clinical Impression: Paced Rhythm          Medical Decision Making:   History:   Old Records Summarized: records from previous admission(s).  Independently Interpreted Test(s):   I have ordered and independently interpreted X-rays - see summary below.       <> Summary of X-Ray Reading(s): chest x-ray interpreted by me reveals no infiltrates, effusions or mediastinal widening  Clinical Tests:   Lab Tests: Ordered  ED Management:  Thierry Fu is a 87 y.o. male who presents with  one-week history of progressively worsening shortness of breath.   He has symptomatic improvement with bronchodilators strongly suggesting asthma.  Chest x-ray fails to demonstrate any evidence of pneumonia or congestive heart failure.  He will be admitted for bronchodilators.            Scribe Attestation:   Scribe #1: I performed the above scribed service and the documentation accurately describes the services I performed. I attest to the accuracy of the note.    Attending Attestation:           Physician Attestation for Scribe:  Physician Attestation Statement for Scribe #1: I, Dr. Raygoza, reviewed documentation, as scribed by Delonte Sharpe in my presence, and it is both accurate and complete.                 ED Course     Clinical Impression:   The primary encounter diagnosis was Mild intermittent asthma with acute exacerbation. A diagnosis of SOB (shortness of breath) was also pertinent to this visit.          Jorge Luis Raygoza III, MD  02/08/17 0963

## 2017-02-08 NOTE — [facility] SECONDARY REVIEW
Physician Advisor External    Level of Care Issue    Approved Observation for admit 2/7/17 per ehr

## 2017-02-08 NOTE — ED NOTES
Patient is resting in bed with eyes closed, chest rise is symmetrical, respirations are regular and unlabored. EZRA ALEXANDRE

## 2017-02-08 NOTE — PLAN OF CARE
Problem: Patient Care Overview  Goal: Plan of Care Review  Outcome: Ongoing (interventions implemented as appropriate)  Lorena aerosol tx well BBS decreased w/ some wheezes noted O2 92% on room air, placed back on NC @ 2L

## 2017-02-08 NOTE — ED NOTES
Patient and family have been updated on remaining lab and room assignment. No needs or questions at this time.

## 2017-02-08 NOTE — SUBJECTIVE & OBJECTIVE
Past Medical History   Diagnosis Date    Asthma     CHF (congestive heart failure)     Coronary artery disease      6 stents    Dementia     Diabetes mellitus     Dyslipidemia     Hyperlipidemia     Hypertension     Pacemaker        Past Surgical History   Procedure Laterality Date    Coronary angioplasty with stent placement  pacemaker    Total hip arthroplasty  Feb 2013 Left    Hernia repair      Cardiac pacemaker placement      Colonoscopy N/A 3/28/2016     Procedure: COLONOSCOPY;  Surgeon: Del Ruvalcaba MD;  Location: [facility];  Service: Endoscopy;  Laterality: N/A;       Review of patient's allergies indicates:   Allergen Reactions    Lisinopril      Cough      Penicillins      Unknown         No current facility-administered medications on file prior to encounter.      Current Outpatient Prescriptions on File Prior to Encounter   Medication Sig    albuterol 90 mcg/actuation inhaler Inhale 2 puffs into the lungs every 6 (six) hours as needed for Wheezing. Rescue    clopidogrel (PLAVIX) 75 mg tablet take 1 tablet by mouth once daily    duloxetine (CYMBALTA) 60 MG capsule take 1 capsule by mouth once daily    ferrous gluconate (FERGON) 324 MG tablet Take 1 tablet (324 mg total) by mouth 2 (two) times daily with meals.    furosemide (LASIX) 20 MG tablet Take 20 mg by mouth daily as needed. For 3 days. Started 3/5/16    glyBURIDE (DIABETA) 5 MG tablet Take 1 tablet (5 mg total) by mouth once daily.    inhalation device (AEROCHAMBER PLUS FLOW-VU) Use as directed for inhalation.    insulin glargine (LANTUS SOLOSTAR) 100 unit/mL (3 mL) InPn pen Inject 15 Units into the skin once daily. inject 15 units subcutaneously once daily (Patient taking differently: Inject 20 Units into the skin once daily. inject 15 units subcutaneously once daily)    insulin lispro (HUMALOG KWIKPEN) 100 unit/mL InPn pen inject 5 units AT LUNCH TIME MEAL Use in addition to sliding scale    isosorbide mononitrate  "(IMDUR) 120 MG 24 hr tablet Take 1 tablet (120 mg total) by mouth once daily.    nitroGLYCERIN (NITROSTAT) 0.4 MG SL tablet Place 0.4 mg under the tongue every 5 (five) minutes as needed.      predniSONE (DELTASONE) 20 MG tablet Take 2 tablets daily x 5 days then 1 tablet daily x 5 days    quetiapine (SEROQUEL) 50 MG tablet Take 2 tabs in AM, 1/2 at noon, and 1every evening    rivastigmine (EXELON) 9.5 mg/24 hr PT24 apply 1 patch once daily    senna-docusate 8.6-50 mg (PERICOLACE) 8.6-50 mg per tablet Take 1 tablet by mouth 2 (two) times daily.    simvastatin (ZOCOR) 40 MG tablet take 1 tablet by mouth at bedtime    sotalol (BETAPACE) 120 MG Tab take 1 tablet by mouth twice a day    valsartan (DIOVAN) 160 MG tablet Take 1 tablet (160 mg total) by mouth once daily. Takes at night    VESICARE 5 mg tablet take 1 tablet by mouth once daily    azithromycin (ZITHROMAX Z-UNA) 250 MG tablet Use as directed    BD INSULIN PEN NEEDLE UF SHORT 31 gauge x 5/16" Ndle use as directed once daily    nitroGLYCERIN 0.4 MG/DOSE TL SPRY (NITROLINGUAL) 400 mcg/spray spray USE 1 TO 2 SPRAYS UNDER THE TONGUE IF NEEDED FOR CHEST PAINS    ONETOUCH ULTRA TEST Strp TEST three times a day    pantoprazole (PROTONIX) 40 MG tablet Take 1 tablet (40 mg total) by mouth once daily.    promethazine-codeine 6.25-10 mg/5 ml (PHENERGAN WITH CODEINE) 6.25-10 mg/5 mL syrup Take 5 mLs by mouth every 6 (six) hours as needed for Cough.     Family History     Problem Relation (Age of Onset)    Diabetes Mother        Social History Main Topics    Smoking status: Former Smoker     Years: 40.00     Quit date: 1/1/1952    Smokeless tobacco: Never Used    Alcohol use No    Drug use: No    Sexual activity: Not on file     Review of Systems   Unable to perform ROS: Dementia     Objective:     Vital Signs (Most Recent):  Temp: 98.1 °F (36.7 °C) (02/08/17 0423)  Pulse: 69 (02/08/17 0724)  Resp: 12 (02/08/17 0724)  BP: 124/85 (02/08/17 0423)  SpO2: " (!) 92 % (02/08/17 0724) Vital Signs (24h Range):  Temp:  [97.3 °F (36.3 °C)-98.1 °F (36.7 °C)] 98.1 °F (36.7 °C)  Pulse:  [61-72] 69  Resp:  [12-28] 12  SpO2:  [92 %-98 %] 92 %  BP: (116-146)/(56-85) 124/85     Weight: 95.3 kg (210 lb)  Body mass index is 28.48 kg/(m^2).    Physical Exam   Constitutional: He appears well-developed and well-nourished. No distress.   Does not appear toxic    HENT:   Head: Normocephalic and atraumatic.   Mouth/Throat: Oropharynx is clear and moist. No oropharyngeal exudate.   Eyes: Conjunctivae are normal. Pupils are equal, round, and reactive to light. No scleral icterus.   Neck: No JVD present. No tracheal deviation present. No thyromegaly present.   Cardiovascular: Normal rate and regular rhythm.  Exam reveals no gallop and no friction rub.    Murmur heard.  PPM left chest wall    Pulmonary/Chest: Effort normal. No respiratory distress. He has no wheezes. He has no rales. He exhibits no tenderness.   Coarse. BBS. No further wheezing at this time   Abdominal: Soft. Bowel sounds are normal. He exhibits no distension and no mass. There is no tenderness.   Musculoskeletal: Normal range of motion. He exhibits edema. He exhibits no tenderness.   Lymphadenopathy:     He has no cervical adenopathy.   Neurological: He displays normal reflexes.   Sleeping, awakes easily to tactile and verbal stimuli.    Skin: Skin is warm and dry. No erythema.   Psychiatric: He has a normal mood and affect. His behavior is normal. Judgment and thought content normal.   Vitals reviewed.       Significant Labs:   CBC:   Recent Labs  Lab 02/07/17 1949   WBC 13.00*   HGB 11.8*   HCT 36.8*   *     CMP:   Recent Labs  Lab 02/07/17 1949      K 4.9      CO2 25   *   BUN 43*   CREATININE 2.4*   CALCIUM 9.3   PROT 6.2   ALBUMIN 2.9*   BILITOT 0.3   ALKPHOS 64   AST 13   ALT 14   ANIONGAP 10   EGFRNONAA 23*       Significant Imaging: CXR reviewed by me-no acute process, poor inspiriation,  PPM

## 2017-02-08 NOTE — CONSULTS
[facility]  Cardiology  Consult Note    Patient Name: Thierry Fu  MRN: [id number]  Admission Date: 2/7/2017  Hospital Length of Stay: 1 days  Code Status: Full Code   Attending Provider: Stacy Figueroa MD   Consulting Provider: Marielle Moran NP  Primary Care Physician: Rosendo Barrow Jr, MD  Principal Problem:Mild intermittent asthma with acute exacerbation    Patient information was obtained from patient, family members, and medical record.     Inpatient consult to Cardiology  Consult performed by: MARIELLE MORAN  Consult ordered by: NYLA AGGARWAL  Reason for consult: PPM evaluation       This is a new patient to me, Marielle Moran NP and to my collaborating physician, Dr. Davis.  Patient is known to our clinic and was last seen by Dr. Paredes on 03/31/2015  Subjective:     Chief Complaint:  SOB and cough     HPI:   PCP: Dr. Barrow   Cardiologist: Previously Dr Paredes, last seen 03/31/2015  Has been living alone at home with 24 hour sitter, family planning to have patient relocate of [facility].   Patient is a retired      Patient contributes very minimally to history secondary to memory problems/dementia. History mostly obtained from medical record and family members (son-in-law and daughter).      Cardiology consultation for pacemaker evaluation in a 86 y/o male with a h/o CAD, coronary stent placement ([id number]?), CHF, PPM, HLD, type 2 DM, HTN, CKD III, asthma, and Alzheimer's.  Per medical record review, patient had PPM placed in 02/2012 (DDD Biotron[employer]); no device interrogation reports available for review.     Patient presented to the ED last night with a c/o a 10 day history of gradually worsening cough with associated SOB and wheezing.  Cough is nonproductive. Lying down seems to worsen symptoms.  Patient has been using an inhaler and steroids for the past 3 days with no significant improvement in symptoms. No recent antibiotic use.  Patient's son-in-law reports  that the patient has also been experiencing pedal edema and orthopnea for the past several days.  Son-in-law administered a one time dose of Lasix 40mg yesterday morning. Denies associated fevers, CP, abdominal pain, n/v, syncope.  At present, patient expresses that he is comfortable and he is denying any complaints.    Patient seen in ED recently last month with similar symptoms.      No elevation in troponin x 1.  BNP slightly elevated at 120 (BNP of 607 and 1057 one year ago). Paced rhythm on EKG and telemetry.  Prior echo in 03/2015 showed normal LVEF of 55 - 60% with diastolic dysfunction. Last stress test (Lexiscan) was in 03/2013 which showed moderate intensity fixed defects in the apical and inferoseptal walls of the left ventricle, consistent with myocardial injury and trivial dell-injury ischemia.(3/22/13)          Past Medical History   Diagnosis Date    Asthma     CHF (congestive heart failure)     Coronary artery disease      6 stents    Dementia     Diabetes mellitus     Dyslipidemia     Hyperlipidemia     Hypertension     Pacemaker        Past Surgical History   Procedure Laterality Date    Coronary angioplasty with stent placement  pacemaker    Total hip arthroplasty  Feb 2013 Left    Hernia repair      Cardiac pacemaker placement      Colonoscopy N/A 3/28/2016     Procedure: COLONOSCOPY;  Surgeon: Del Ruvalcaba MD;  Location: [facility];  Service: Endoscopy;  Laterality: N/A;       Review of patient's allergies indicates:   Allergen Reactions    Lisinopril      Cough      Penicillins      Unknown         No current facility-administered medications on file prior to encounter.      Current Outpatient Prescriptions on File Prior to Encounter   Medication Sig    albuterol 90 mcg/actuation inhaler Inhale 2 puffs into the lungs every 6 (six) hours as needed for Wheezing. Rescue    clopidogrel (PLAVIX) 75 mg tablet take 1 tablet by mouth once daily    duloxetine (CYMBALTA) 60 MG  "capsule take 1 capsule by mouth once daily    ferrous gluconate (FERGON) 324 MG tablet Take 1 tablet (324 mg total) by mouth 2 (two) times daily with meals.    furosemide (LASIX) 20 MG tablet Take 20 mg by mouth daily as needed. For 3 days. Started 3/5/16    glyBURIDE (DIABETA) 5 MG tablet Take 1 tablet (5 mg total) by mouth once daily.    inhalation device (AEROCHAMBER PLUS FLOW-VU) Use as directed for inhalation.    insulin glargine (LANTUS SOLOSTAR) 100 unit/mL (3 mL) InPn pen Inject 15 Units into the skin once daily. inject 15 units subcutaneously once daily (Patient taking differently: Inject 20 Units into the skin once daily. inject 15 units subcutaneously once daily)    insulin lispro (HUMALOG KWIKPEN) 100 unit/mL InPn pen inject 5 units AT LUNCH TIME MEAL Use in addition to sliding scale    isosorbide mononitrate (IMDUR) 120 MG 24 hr tablet Take 1 tablet (120 mg total) by mouth once daily.    nitroGLYCERIN (NITROSTAT) 0.4 MG SL tablet Place 0.4 mg under the tongue every 5 (five) minutes as needed.      predniSONE (DELTASONE) 20 MG tablet Take 2 tablets daily x 5 days then 1 tablet daily x 5 days    quetiapine (SEROQUEL) 50 MG tablet Take 2 tabs in AM, 1/2 at noon, and 1every evening    rivastigmine (EXELON) 9.5 mg/24 hr PT24 apply 1 patch once daily    senna-docusate 8.6-50 mg (PERICOLACE) 8.6-50 mg per tablet Take 1 tablet by mouth 2 (two) times daily.    simvastatin (ZOCOR) 40 MG tablet take 1 tablet by mouth at bedtime    sotalol (BETAPACE) 120 MG Tab take 1 tablet by mouth twice a day    valsartan (DIOVAN) 160 MG tablet Take 1 tablet (160 mg total) by mouth once daily. Takes at night    VESICARE 5 mg tablet take 1 tablet by mouth once daily    azithromycin (ZITHROMAX Z-UNA) 250 MG tablet Use as directed    BD INSULIN PEN NEEDLE UF SHORT 31 gauge x 5/16" Ndle use as directed once daily    nitroGLYCERIN 0.4 MG/DOSE TL SPRY (NITROLINGUAL) 400 mcg/spray spray USE 1 TO 2 SPRAYS UNDER THE " TONGUE IF NEEDED FOR CHEST PAINS    ONETOUCH ULTRA TEST Strp TEST three times a day    pantoprazole (PROTONIX) 40 MG tablet Take 1 tablet (40 mg total) by mouth once daily.    promethazine-codeine 6.25-10 mg/5 ml (PHENERGAN WITH CODEINE) 6.25-10 mg/5 mL syrup Take 5 mLs by mouth every 6 (six) hours as needed for Cough.     Family History     Problem Relation (Age of Onset)    Diabetes Mother        Social History Main Topics    Smoking status: Former Smoker     Years: 40.00     Quit date: 1/1/1952    Smokeless tobacco: Never Used    Alcohol use No    Drug use: No    Sexual activity: Not on file     Review of Systems   Constitution: Negative for chills, diaphoresis, weakness, malaise/fatigue and weight gain.   HENT: Negative for congestion and sore throat.    Cardiovascular: Positive for leg swelling (pedal edema ) and orthopnea. Negative for chest pain, dyspnea on exertion, near-syncope, palpitations and syncope.   Respiratory: Positive for cough, shortness of breath and wheezing. Negative for hemoptysis.    Hematologic/Lymphatic: Does not bruise/bleed easily.   Skin: Negative for color change and rash.   Musculoskeletal: Negative for arthritis, back pain and joint swelling.   Gastrointestinal: Negative for abdominal pain, constipation, diarrhea, melena, nausea and vomiting.   Genitourinary: Negative for dysuria.   Neurological: Negative for dizziness, focal weakness, light-headedness, numbness and paresthesias.   Psychiatric/Behavioral: Negative for depression.        No change in mental status      Objective:     Vital Signs (Most Recent):  Temp: 98.1 °F (36.7 °C) (02/08/17 0423)  Pulse: 69 (02/08/17 0724)  Resp: 12 (02/08/17 0724)  BP: 124/85 (02/08/17 0423)  SpO2: (!) 92 % (02/08/17 0724) Vital Signs (24h Range):  Temp:  [97.3 °F (36.3 °C)-98.1 °F (36.7 °C)] 98.1 °F (36.7 °C)  Pulse:  [61-72] 69  Resp:  [12-28] 12  SpO2:  [92 %-98 %] 92 %  BP: (116-146)/(56-85) 124/85     Weight: 95.3 kg (210 lb)  Body  mass index is 28.48 kg/(m^2).    SpO2: (!) 92 %  O2 Device (Oxygen Therapy): room air    No intake or output data in the 24 hours ending 02/08/17 0903    Lines/Drains/Airways     Peripheral Intravenous Line                 Peripheral IV - Single Lumen 02/07/17 2127 Right Hand less than 1 day                Physical Exam   Constitutional: He appears well-developed and well-nourished. No distress.   HENT:   Head: Normocephalic and atraumatic.   Eyes: Conjunctivae and EOM are normal. Right eye exhibits no discharge. Left eye exhibits no discharge. No scleral icterus.   Neck: Normal range of motion. Normal carotid pulses and no JVD present. Carotid bruit is not present.   Cardiovascular: Normal rate and regular rhythm.    Pulses:       Radial pulses are 2+ on the right side, and 2+ on the left side.        Dorsalis pedis pulses are 1+ on the right side, and 1+ on the left side.   Distant heart tones    Pulmonary/Chest: Effort normal. No respiratory distress. He has no wheezes.   Diminished sounds bilateral bases with poor inspiratory effort    Abdominal: Soft. Bowel sounds are normal. There is no tenderness. There is no guarding.   Musculoskeletal: Normal range of motion.   1 + pitting edema to right and left lower legs, no associated tenderness    Neurological: He is alert.   Oriented to person.    Skin: Skin is warm and dry. He is not diaphoretic. No cyanosis. Nails show no clubbing.   Psychiatric: He has a normal mood and affect. His behavior is normal. Cognition and memory are impaired.   Vitals reviewed.      Significant Labs:   CMP   Recent Labs  Lab 02/07/17 1949      K 4.9      CO2 25   *   BUN 43*   CREATININE 2.4*   CALCIUM 9.3   PROT 6.2   ALBUMIN 2.9*   BILITOT 0.3   ALKPHOS 64   AST 13   ALT 14   ANIONGAP 10   ESTGFRAFRICA 27*   EGFRNONAA 23*   , CBC   Recent Labs  Lab 02/07/17 1949   WBC 13.00*   HGB 11.8*   HCT 36.8*   *    and Troponin   Recent Labs  Lab 02/07/17 1949    TROPONINI 0.017       Significant Imaging:   CXR 02/07/2017:  Findings: A left-sided dual-lead cardiac pacer is present.  Ours is normal.  There is calcification of the aorta.  Lung lungs are moderately low.  No consolidation or pleural effusion.  A round retrocardiac opacity is present compatible with a hiatal hernia.    Impression: Moderately low lung volumes without acute process.  Left cardiac pacer.  Hiatal hernia.    Prior Echo 03/2015:  CONCLUSIONS     1 - Concentric remodeling.     2 - Normal left ventricular systolic function (EF 55-60%).     3 - Left ventricular diastolic dysfunction.     4 - Normal right ventricular systolic function .     5 - Trivial aortic regurgitation.     6 - Intermediate central venous pressure.     Prior NM Stress Test/Lexiscan 03/2013:  EKG Conclusions:  1. The EKG portion of this study is uninterpretable for ischemia at a peak heart rate of 95 bpm (69% of predicted).   2. Blood pressure remained stable throughout the protocol  (Presenting BP: 124/67 Peak BP: 149/65).   3. No significant arrhythmias were present.   4. The patient reported dyspnea during the protocol which resolved in recovery.       Nuclear Quantitative Functional Analysis:   LVEF: 58 % (normal is 47 - 59)  LVED Volume: 125 ml (normal is 91 - 155)  LVES Volume: 52 ml (normal is 40 - 78)    Impression: ABNORMAL MYOCARDIAL PERFUSION  1. There is moderate intensity fixed defects in the apical and inferoseptal walls of the left ventricle, consistent with myocardial injury. There is trivial dell-injury ischemia.   2. There is abnormal wall motion at rest showing moderate hypokinesis of the apical and inferoseptal walls of the left ventricle.   3. Resting LV function is normal.  (normal is 47 - 59)  4. The ventricular volumes are normal at rest and stress.   5. The extracardiac distribution of radioactivity is normal.   6. There was no previous study available to compare.      Assessment and Plan:     [facility]  Problems    Diagnosis    *Mild intermittent asthma with acute exacerbation    Type 2 diabetes mellitus with circulatory disorder    PAF (paroxysmal atrial fibrillation)    Pacemaker    Chronic kidney disease (CKD), stage III (moderate)    Late onset Alzheimer's disease without behavioral disturbance       Cardiology consultation for pacemaker evaluation in a 86 y/o male with a h/o CAD, coronary stent placement ([id number]?), CHF, PPM, HLD, type 2 DM, HTN, CKD III, asthma, and Alzheimer's.  Per medical record review, patient had PPM placed in 02/2012 (DDD Biotronik); no device interrogation reports available for review.     Device interrogation done today showing normal device function.   See MD attestation statement for additional recommendations.      Marielle Moran NP  Cardiology   [facility]

## 2017-02-08 NOTE — ED NOTES
"Daughter reports that patient has been orthopneic for the past 4 nights. "He has been sleeping, sitting up in a recliner."    "

## 2017-02-08 NOTE — ASSESSMENT & PLAN NOTE
Nursing reports unusual firing pattern by dual PPM  Clinically, heart tones-RRR. No evidence of heart failure  Will order interrogation and evaluation by cardiology

## 2017-02-08 NOTE — UM SECONDARY REVIEW
Physician Advisor External    Level of Care Issue    Approved Inpatient for 2/8/17 per dr nair after further review of additional information.....

## 2017-02-09 LAB
AORTIC VALVE REGURGITATION: NORMAL
CHOLEST/HDLC SERPL: 3.5 {RATIO}
DIASTOLIC DYSFUNCTION: NO
HDL/CHOLESTEROL RATIO: 28.7 %
HDLC SERPL-MCNC: 150 MG/DL
HDLC SERPL-MCNC: 43 MG/DL
LDLC SERPL CALC-MCNC: 96.4 MG/DL
NONHDLC SERPL-MCNC: 107 MG/DL
POCT GLUCOSE: 175 MG/DL (ref 70–110)
POCT GLUCOSE: 200 MG/DL (ref 70–110)
POCT GLUCOSE: 268 MG/DL (ref 70–110)
POCT GLUCOSE: 314 MG/DL (ref 70–110)
RETIRED EF AND QEF - SEE NOTES: 55 (ref 55–65)
TRIGL SERPL-MCNC: 53 MG/DL

## 2017-02-09 PROCEDURE — 36415 COLL VENOUS BLD VENIPUNCTURE: CPT

## 2017-02-09 PROCEDURE — 3E0234Z INTRODUCTION OF SERUM, TOXOID AND VACCINE INTO MUSCLE, PERCUTANEOUS APPROACH: ICD-10-PCS | Performed by: HOSPITALIST

## 2017-02-09 PROCEDURE — 94640 AIRWAY INHALATION TREATMENT: CPT

## 2017-02-09 PROCEDURE — 27000221 HC OXYGEN, UP TO 24 HOURS

## 2017-02-09 PROCEDURE — 90471 IMMUNIZATION ADMIN: CPT | Performed by: FAMILY MEDICINE

## 2017-02-09 PROCEDURE — 99232 SBSQ HOSP IP/OBS MODERATE 35: CPT | Mod: 25,,, | Performed by: INTERNAL MEDICINE

## 2017-02-09 PROCEDURE — 63600175 PHARM REV CODE 636 W HCPCS: Performed by: EMERGENCY MEDICINE

## 2017-02-09 PROCEDURE — 86580 TB INTRADERMAL TEST: CPT | Performed by: FAMILY MEDICINE

## 2017-02-09 PROCEDURE — 90472 IMMUNIZATION ADMIN EACH ADD: CPT | Performed by: FAMILY MEDICINE

## 2017-02-09 PROCEDURE — 25000003 PHARM REV CODE 250: Performed by: EMERGENCY MEDICINE

## 2017-02-09 PROCEDURE — 94761 N-INVAS EAR/PLS OXIMETRY MLT: CPT

## 2017-02-09 PROCEDURE — 63600175 PHARM REV CODE 636 W HCPCS: Performed by: FAMILY MEDICINE

## 2017-02-09 PROCEDURE — G0008 ADMIN INFLUENZA VIRUS VAC: HCPCS | Performed by: FAMILY MEDICINE

## 2017-02-09 PROCEDURE — 90662 IIV NO PRSV INCREASED AG IM: CPT | Performed by: FAMILY MEDICINE

## 2017-02-09 PROCEDURE — 97530 THERAPEUTIC ACTIVITIES: CPT

## 2017-02-09 PROCEDURE — 80061 LIPID PANEL: CPT

## 2017-02-09 PROCEDURE — 97165 OT EVAL LOW COMPLEX 30 MIN: CPT

## 2017-02-09 PROCEDURE — 97116 GAIT TRAINING THERAPY: CPT

## 2017-02-09 PROCEDURE — 63600175 PHARM REV CODE 636 W HCPCS: Performed by: NURSE PRACTITIONER

## 2017-02-09 PROCEDURE — 25000242 PHARM REV CODE 250 ALT 637 W/ HCPCS: Performed by: EMERGENCY MEDICINE

## 2017-02-09 PROCEDURE — 97535 SELF CARE MNGMENT TRAINING: CPT

## 2017-02-09 PROCEDURE — G0009 ADMIN PNEUMOCOCCAL VACCINE: HCPCS | Performed by: FAMILY MEDICINE

## 2017-02-09 PROCEDURE — 25000003 PHARM REV CODE 250: Performed by: NURSE PRACTITIONER

## 2017-02-09 PROCEDURE — C8929 TTE W OR WO FOL WCON,DOPPLER: HCPCS

## 2017-02-09 PROCEDURE — 63600175 PHARM REV CODE 636 W HCPCS: Performed by: INTERNAL MEDICINE

## 2017-02-09 PROCEDURE — 11000001 HC ACUTE MED/SURG PRIVATE ROOM

## 2017-02-09 PROCEDURE — 97161 PT EVAL LOW COMPLEX 20 MIN: CPT

## 2017-02-09 PROCEDURE — 90670 PCV13 VACCINE IM: CPT | Performed by: FAMILY MEDICINE

## 2017-02-09 RX ORDER — ATORVASTATIN CALCIUM 40 MG/1
40 TABLET, FILM COATED ORAL DAILY
Status: DISCONTINUED | OUTPATIENT
Start: 2017-02-09 | End: 2017-02-16 | Stop reason: HOSPADM

## 2017-02-09 RX ADMIN — HALOPERIDOL LACTATE 5 MG: 5 INJECTION, SOLUTION INTRAMUSCULAR at 12:02

## 2017-02-09 RX ADMIN — INSULIN ASPART 4 UNITS: 100 INJECTION, SOLUTION INTRAVENOUS; SUBCUTANEOUS at 11:02

## 2017-02-09 RX ADMIN — METHYLPREDNISOLONE SODIUM SUCCINATE 80 MG: 125 INJECTION, POWDER, FOR SOLUTION INTRAMUSCULAR; INTRAVENOUS at 03:02

## 2017-02-09 RX ADMIN — PANTOPRAZOLE SODIUM 40 MG: 40 TABLET, DELAYED RELEASE ORAL at 08:02

## 2017-02-09 RX ADMIN — INSULIN DETEMIR 15 UNITS: 100 INJECTION, SOLUTION SUBCUTANEOUS at 08:02

## 2017-02-09 RX ADMIN — QUETIAPINE FUMARATE 50 MG: 25 TABLET, FILM COATED ORAL at 08:02

## 2017-02-09 RX ADMIN — HUMAN ALBUMIN MICROSPHERES AND PERFLUTREN 0.11 MG: 10; .22 INJECTION, SOLUTION INTRAVENOUS at 11:02

## 2017-02-09 RX ADMIN — METHYLPREDNISOLONE SODIUM SUCCINATE 80 MG: 125 INJECTION, POWDER, FOR SOLUTION INTRAMUSCULAR; INTRAVENOUS at 06:02

## 2017-02-09 RX ADMIN — IPRATROPIUM BROMIDE AND ALBUTEROL SULFATE 3 ML: .5; 3 SOLUTION RESPIRATORY (INHALATION) at 07:02

## 2017-02-09 RX ADMIN — IPRATROPIUM BROMIDE AND ALBUTEROL SULFATE 3 ML: .5; 3 SOLUTION RESPIRATORY (INHALATION) at 01:02

## 2017-02-09 RX ADMIN — DULOXETINE 60 MG: 30 CAPSULE, DELAYED RELEASE ORAL at 08:02

## 2017-02-09 RX ADMIN — PNEUMOCOCCAL 13-VALENT CONJUGATE VACCINE 0.5 ML: 2.2; 2.2; 2.2; 2.2; 2.2; 4.4; 2.2; 2.2; 2.2; 2.2; 2.2; 2.2; 2.2 INJECTION, SUSPENSION INTRAMUSCULAR at 03:02

## 2017-02-09 RX ADMIN — QUETIAPINE FUMARATE 50 MG: 25 TABLET, FILM COATED ORAL at 09:02

## 2017-02-09 RX ADMIN — ISOSORBIDE MONONITRATE 120 MG: 60 TABLET, EXTENDED RELEASE ORAL at 08:02

## 2017-02-09 RX ADMIN — CLOPIDOGREL BISULFATE 75 MG: 75 TABLET ORAL at 08:02

## 2017-02-09 RX ADMIN — SOTALOL HYDROCHLORIDE 120 MG: 80 TABLET ORAL at 08:02

## 2017-02-09 RX ADMIN — INSULIN ASPART 3 UNITS: 100 INJECTION, SOLUTION INTRAVENOUS; SUBCUTANEOUS at 05:02

## 2017-02-09 RX ADMIN — TUBERCULIN PURIFIED PROTEIN DERIVATIVE 5 UNITS: 5 INJECTION, SOLUTION INTRADERMAL at 03:02

## 2017-02-09 RX ADMIN — IPRATROPIUM BROMIDE AND ALBUTEROL SULFATE 3 ML: .5; 3 SOLUTION RESPIRATORY (INHALATION) at 12:02

## 2017-02-09 RX ADMIN — VALSARTAN 160 MG: 80 TABLET ORAL at 08:02

## 2017-02-09 RX ADMIN — ATORVASTATIN CALCIUM 40 MG: 40 TABLET, FILM COATED ORAL at 11:02

## 2017-02-09 RX ADMIN — INFLUENZA A VIRUS A/CALIFORNIA/7/2009 X-179A (H1N1) ANTIGEN (FORMALDEHYDE INACTIVATED), INFLUENZA A VIRUS A/HONG KONG/4801/2014 X-263B (H3N2) ANTIGEN (FORMALDEHYDE INACTIVATED), AND INFLUENZA B VIRUS B/BRISBANE/60/2008 ANTIGEN (FORMALDEHYDE INACTIVATED) 0.5 ML: 60; 60; 60 INJECTION, SUSPENSION INTRAMUSCULAR at 03:02

## 2017-02-09 RX ADMIN — METHYLPREDNISOLONE SODIUM SUCCINATE 80 MG: 125 INJECTION, POWDER, FOR SOLUTION INTRAMUSCULAR; INTRAVENOUS at 09:02

## 2017-02-09 RX ADMIN — RIVASTIGMINE TRANSDERMAL SYSTEM 1 PATCH: 9.5 PATCH, EXTENDED RELEASE TRANSDERMAL at 08:02

## 2017-02-09 NOTE — PROGRESS NOTES
Dr Barrow returned to case management office and stated that the family decided on SNF; [facility]st choice is [facility] and 2nd choice is [facility] [place].    Updated Dr Figueroa.

## 2017-02-09 NOTE — PLAN OF CARE
Problem: Occupational Therapy Goal  Goal: Occupational Therapy Goal  Goals to be met by: 2/19/17     Patient will increase functional independence with ADLs by performing:    UE Dressing with Set-up Assistance and Stand-by Assistance.  LE Dressing with Set-up Assistance and Minimal Assistance.  Grooming while seated with Set-up Assistance.  Toileting from bedside commode with Set-up Assistance, Minimal Assistance and Assistive Devices as needed for hygiene and clothing management.   Toilet transfer to bedside commode with Minimal Assistance.  Outcome: Ongoing (interventions implemented as appropriate)  OT evaluation completed today. Goals & care plan established.     CHRISTOPHER Little  2/9/2017

## 2017-02-09 NOTE — PLAN OF CARE
"Problem: Patient Care Overview  Goal: Plan of Care Review  Outcome: Ongoing (interventions implemented as appropriate)    02/09/17 2706   Coping/Psychosocial   Plan Of Care Reviewed With daughter   Remains confused throughout the day but able to redirect.  Wants to "go home".  Participated in therapy and sat in chair for a while.  Tolerated well.  Has dry, non-productive cough.   Vs stable, remains in paced rhythmn.  No falls or injuries.  Bed alarm activated while in bed- tries to get up but too weak to stand without assistance.  Possible discharge to SNF soon.        "

## 2017-02-09 NOTE — SUBJECTIVE & OBJECTIVE
Interval History: uneventful night. Pt awaiting acceptance to SNF.    Review of Systems   Constitutional: Negative for chills and fever.   Respiratory: Negative for shortness of breath.    Cardiovascular: Negative for chest pain.   Gastrointestinal: Negative for abdominal pain.   Genitourinary: Negative for dysuria.   Neurological: Negative for headaches.     Objective:     Vital Signs (Most Recent):  Temp: 97.5 °F (36.4 °C) (02/09/17 0730)  Pulse: 83 (02/09/17 1233)  Resp: 18 (02/09/17 1233)  BP: (!) 150/74 (02/09/17 0730)  SpO2: (!) 92 % (02/09/17 1233) Vital Signs (24h Range):  Temp:  [97.5 °F (36.4 °C)-98.9 °F (37.2 °C)] 97.5 °F (36.4 °C)  Pulse:  [66-83] 83  Resp:  [17-20] 18  SpO2:  [92 %-95 %] 92 %  BP: (140-150)/(74-76) 150/74     Weight: 95.3 kg (210 lb)  Body mass index is 28.48 kg/(m^2).    Intake/Output Summary (Last 24 hours) at 02/09/17 1512  Last data filed at 02/08/17 1800   Gross per 24 hour   Intake              150 ml   Output                0 ml   Net              150 ml      Physical Exam   Constitutional: He appears well-developed and well-nourished. No distress.   HENT:   Head: Normocephalic and atraumatic.   Eyes: Conjunctivae are normal.   Neck: Neck supple. No JVD present.   Cardiovascular: Normal rate, regular rhythm and normal heart sounds.    Pulmonary/Chest: Effort normal and breath sounds normal. No respiratory distress.   Abdominal: Soft. Bowel sounds are normal. He exhibits no distension.   Musculoskeletal: He exhibits no edema.   Neurological: He is alert.   Psychiatric: He has a normal mood and affect. His behavior is normal.   Vitals reviewed.      Significant Labs:   Lipid Panel:   Recent Labs  Lab 02/09/17  0428   CHOL 150   HDL 43   LDLCALC 96.4   TRIG 53   CHOLHDL 28.7       Significant Imaging: I have reviewed all pertinent imaging results/findings within the past 24 hours.

## 2017-02-09 NOTE — PROGRESS NOTES
Dr Barrow, pt's son in law, came to this 's office to discuss d/c plan.  Plan was for patient to discharge to [facility] memory unit with  however Dr Barrow is now thinking that patient may needs SNF prior to going to [facility].  Dr Barrow plans to discuss with the family and let me know their decision.

## 2017-02-09 NOTE — PROGRESS NOTES
1525  Left message for [facility] to call in locet; they will call me back.    1610  Loc called in to [facility].  RADHA faxed to [facility].

## 2017-02-09 NOTE — PLAN OF CARE
02/09/17 0730   Patient Assessment/Suction   Level of Consciousness (AVPU) responds to voice   Respiratory Effort Other (Comment)  (moaning in sleep)   PRE-TX-O2-ETCO2   O2 Device (Oxygen Therapy) nasal cannula w/ humidification   $ Is the patient on Oxygen? Yes   Flow (L/min) 1.5   Oxygen Concentration (%) 26   SpO2 95 %   Pulse Oximetry Type Intermittent   $ Pulse Oximetry - Multiple Charge Pulse Oximetry - Multiple   Pulse 76   Resp 18   Aerosol Therapy   $ Aerosol Therapy Charges Aerosol Treatment   Respiratory Treatment Status given   SVN/Inhaler Treatment Route mask   Position During Treatment HOB at 20-30 degrees   Patient Tolerance good   Post-Treatment   Post-treatment Heart Rate (beats/min) 79   Post-treatment Resp Rate (breaths/min) 18   All Fields Breath Sounds aeration increased   Ready to Wean/Extubation Screen   FIO2<60 (chart decimal) 0.26

## 2017-02-09 NOTE — ASSESSMENT & PLAN NOTE
Improved. Continue IV steroids, duonebs and supplemental O2. Pt will need continuous home O2.

## 2017-02-09 NOTE — PLAN OF CARE
Problem: Confusion, Acute (Adult)  Intervention: Monitor/Assist with Self Care    02/09/17 1544   Activity   Activity Assistance Provided assistance, 1 person   Daily Care Interventions   Self-Care Promotion meal setup provided

## 2017-02-09 NOTE — PT/OT/SLP EVAL
Occupational Therapy  Evaluation    Thierry Fu   MRN: [id number]   Admitting Diagnosis: Mild intermittent asthma with acute exacerbation    OT Date of Treatment: 02/09/17   OT Start Time: 0855  OT Stop Time: 0925  OT Total Time (min): 30 min    Billable Minutes:  Evaluation 8  Self Care/Home Management 22    Diagnosis: Mild intermittent asthma with acute exacerbation   debility    Past Medical History   Diagnosis Date    Asthma     CHF (congestive heart failure)     Coronary artery disease      6 stents    Dementia     Diabetes mellitus     Dyslipidemia     Hyperlipidemia     Hypertension     Pacemaker       Past Surgical History   Procedure Laterality Date    Coronary angioplasty with stent placement  pacemaker    Total hip arthroplasty  Feb 2013 Left    Hernia repair      Cardiac pacemaker placement      Colonoscopy N/A 3/28/2016     Procedure: COLONOSCOPY;  Surgeon: Del Ruvalcaba MD;  Location: [facility];  Service: Endoscopy;  Laterality: N/A;       Referring physician: Murali  Date referred to OT: 2/8/17    General Precautions: Standard, fall  Orthopedic Precautions: N/A  Braces: N/A          Patient History:  Living Environment  Lives With: alone (with sitters)  Living Arrangements: house  Transportation Available: family or friend will provide  Living Environment Comment: Pt's family wishes for pt to discharge to [facility] where his wife lives.  Equipment Currently Used at Home: rollator, cane, quad    Prior level of function:   Bed Mobility/Transfers: needs device  Grooming: needs assist  Bathing: needs device and assist  Upper Body Dressing: needs assist  Lower Body Dressing: needs assist  Toileting: needs assist  Home Management Skills: unable to perform  Homemaking Responsibilities: No  IADL Comments: Pt had 24 hour sitters at home & received some assistance with ADLs.     Dominant hand: right    Subjective:  Communicated with nurse Kaity prior to session.  Stated patient  "was cleared for OT today.  Chief Complaint: "I have to go to the bathroom." (stated this even after pt was assisted to use the urinal bottle unsuccessfully)  Patient/Family stated goals: "To walk all over the streets."    Pain Ratin/10              Pain Rating Post-Intervention: 0/10    Objective:  Patient found with: oxygen, telemetry    Cognitive Exam:  Oriented to: Person  Follows Commands/attention: Follows one-step commands  Communication: clear/fluent  Memory:  Impaired STM, Impaired LTM and Poor immediate recall  Safety awareness/insight to disability: impaired  Coping skills/emotional control: Appropriate to situation    Visual/perceptual:  Intact    Physical Exam:  Postural examination/scapula alignment: Rounded shoulder and Head forward  Skin integrity: Visible skin intact  Edema: None noted     Sensation:   Intact    Upper Extremity Range of Motion:  Right Upper Extremity: WFL  Left Upper Extremity: WFL    Upper Extremity Strength:  Right Upper Extremity: WFL  Left Upper Extremity: WFL   Strength: WFL    Fine motor coordination:   Some B UE shaking noted with overhead reach but intact during all self care tasks    Gross motor coordination: WFL    Functional Mobility:  Transfers:  Sit <> Stand Assistance: Moderate Assistance (1-step cues for technique)  Sit <> Stand Assistive Device: Rolling Walker  Pt stood ~ 2 minutes trying to use urinal bottle.    Activities of Daily Living:  Feeding Level of Assistance: Set-up Assistance, Supervision    Grooming Position: bedside chair (set-up and cues to initiate)  Grooming Level of Assistance: Supervision  Toileting Where Assessed: Other (Comment)  Toileting Level of Assistance: Total assistance (standing at bedside chair with walker to attempt to use urinal bottle but pt was unable to do so. (A) for brief mgmt, balance and holding urinal bottle)    Balance:   Static Sit: FAIR: Maintains without assist, but unable to take any challenges   Dynamic Sit: FAIR: " "Cannot move trunk without losing balance  Static Stand: POOR: Needs MODERATE assist to maintain  Dynamic stand: POOR: N/A    Therapeutic Activities and Exercises:  OT oriented pt to time, place & situation as well as how to use call button and bed controls.  OT ed pt on OT role & POC as well as discharge recommendations.  OT ed patient on safety with walker use for transfers with cues for hand placement & sequencing.       AM-PAC 6 CLICK ADL  How much help from another person does this patient currently need?  1 = Unable, Total/Dependent Assistance  2 = A lot, Maximum/Moderate Assistance  3 = A little, Minimum/Contact Guard/Supervision  4 = None, Modified [place]/Independent    Putting on and taking off regular lower body clothing? : 2  Bathing (including washing, rinsing, drying)?: 2  Toileting, which includes using toilet, bedpan, or urinal? : 1  Putting on and taking off regular upper body clothing?: 3  Taking care of personal grooming such as brushing teeth?: 3  Eating meals?: 3  Total Score: 14    AM-PAC Raw Score CMS "G-Code Modifier Level of Impairment Assistance   6 % Total / Unable   7 - 9 CM 80 - 100% Maximal Assist   10 - 14 CL 60 - 80% Moderate Assist   15 - 19 CK 40 - 60% Moderate Assist   20 - 22 CJ 20 - 40% Minimal Assist   23 CI 1-20% SBA / CGA   24 CH 0% Independent/ Mod I       Patient left up in chair with all lines intact, call button in reach, Kaity, nurse notified and chair in reclined position     Assessment:  Thierry Fu is a 87 y.o. male with a medical diagnosis of Mild intermittent asthma with acute exacerbation and presents with impaired functional status with self care & functional mobility tasks. Recommend OT treatment to maximize endurance, safety & independence with ADLs.    Rehab identified problem list/impairments: Rehab identified problem list/impairments: weakness, impaired endurance, impaired self care skills, impaired balance, gait instability, impaired " functional mobilty, impaired cardiopulmonary response to activity, impaired cognition, decreased safety awareness, impaired fine motor    Rehab potential is fair.    Activity tolerance: Fair    Discharge recommendations: Discharge Facility/Level Of Care Needs: assisted living facility, home health PT, home health OT (home health therapy at an [facility])     Barriers to discharge:   None if pt goes to longterm    Equipment recommendations:       GOALS:   Occupational Therapy Goals        Problem: Occupational Therapy Goal    Goal Priority Disciplines Outcome Interventions   Occupational Therapy Goal     OT, PT/OT Ongoing (interventions implemented as appropriate)    Description:  Goals to be met by: 2/19/17     Patient will increase functional independence with ADLs by performing:    UE Dressing with Set-up Assistance and Stand-by Assistance.  LE Dressing with Set-up Assistance and Minimal Assistance.  Grooming while seated with Set-up Assistance.  Toileting from bedside commode with Set-up Assistance, Minimal Assistance and Assistive Devices as needed for hygiene and clothing management.   Toilet transfer to bedside commode with Minimal Assistance.                PLAN:  Patient to be seen 3 x/week, 4 x/week to address the above listed problems via self-care/home management, therapeutic activities, therapeutic exercises, cognitive retraining  Plan of Care expires: 02/19/17  Plan of Care reviewed with: patient         CHRISTOPHER Kang  02/09/2017

## 2017-02-09 NOTE — PROGRESS NOTES
[facility] Medicine  Progress Note    Patient Name: Thierry Fu  MRN: [id number]  Patient Class: IP- Inpatient   Admission Date: 2/7/2017  Length of Stay: 2 days  Attending Physician: Stacy Figueroa MD  Primary Care Provider: Rosendo Barrow Jr, MD        Subjective:     Principal Problem:Mild intermittent asthma with acute exacerbation    HPI:  Mr. Fu is an 88 yo male  admitted to the services of hospital medicine via the ER for asthma exacerbation. Unable to obtain history and ROS from pt due to dementia. History obtained from ER physician, family and previous epic records. Presents with CC of non-productive cough x 10 days. Associated with wheezing, SOB which are exacerbated by lying down. Started bronchodilator for wheezing and corticosteroids 3 days ago. No improvement noted. Remote history of smoking, quit 60 years ago. History of CAD and PPM. Diabetes type II controlled on oral medications.  Other past medical history as listed below.     Hospital Course:  Pt given supplemental O2, steroids and nebs. Cardiology evaluated patient for pacemake interrogation.     Interval History: uneventful night. Pt awaiting acceptance to SNF.    Review of Systems   Constitutional: Negative for chills and fever.   Respiratory: Negative for shortness of breath.    Cardiovascular: Negative for chest pain.   Gastrointestinal: Negative for abdominal pain.   Genitourinary: Negative for dysuria.   Neurological: Negative for headaches.     Objective:     Vital Signs (Most Recent):  Temp: 97.5 °F (36.4 °C) (02/09/17 0730)  Pulse: 83 (02/09/17 1233)  Resp: 18 (02/09/17 1233)  BP: (!) 150/74 (02/09/17 0730)  SpO2: (!) 92 % (02/09/17 1233) Vital Signs (24h Range):  Temp:  [97.5 °F (36.4 °C)-98.9 °F (37.2 °C)] 97.5 °F (36.4 °C)  Pulse:  [66-83] 83  Resp:  [17-20] 18  SpO2:  [92 %-95 %] 92 %  BP: (140-150)/(74-76) 150/74     Weight: 95.3 kg (210 lb)  Body mass index is 28.48  kg/(m^2).    Intake/Output Summary (Last 24 hours) at 02/09/17 1512  Last data filed at 02/08/17 1800   Gross per 24 hour   Intake              150 ml   Output                0 ml   Net              150 ml      Physical Exam   Constitutional: He appears well-developed and well-nourished. No distress.   HENT:   Head: Normocephalic and atraumatic.   Eyes: Conjunctivae are normal.   Neck: Neck supple. No JVD present.   Cardiovascular: Normal rate, regular rhythm and normal heart sounds.    Pulmonary/Chest: Effort normal and breath sounds normal. No respiratory distress.   Abdominal: Soft. Bowel sounds are normal. He exhibits no distension.   Musculoskeletal: He exhibits no edema.   Neurological: He is alert.   Psychiatric: He has a normal mood and affect. His behavior is normal.   Vitals reviewed.      Significant Labs:   Lipid Panel:   Recent Labs  Lab 02/09/17  0428   CHOL 150   HDL 43   LDLCALC 96.4   TRIG 53   CHOLHDL 28.7       Significant Imaging: I have reviewed all pertinent imaging results/findings within the past 24 hours.    Assessment/Plan:      * Mild intermittent asthma with acute exacerbation  Improved. Continue IV steroids, duonebs and supplemental O2. Pt will need continuous home O2.           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 Continue home meds.         Pacemaker  Interrogation done. Working properly.       Type 2 diabetes mellitus with circulatory disorder  Continue basal and SSI.      VTE Risk Mitigation         Ordered     Medium Risk of VTE  Once      02/07/17 0191          Stacy Figueroa MD  Department of Hospital Medicine   [facility]

## 2017-02-09 NOTE — PLAN OF CARE
Problem: Patient Care Overview  Goal: Plan of Care Review  Outcome: Ongoing (interventions implemented as appropriate)    02/08/17 3727   Coping/Psychosocial   Plan Of Care Reviewed With patient;family   Pt denies pain.   NAD noted. POC reviewed w pt and they verbalized understanding. Tele- Paced    Pt free from falls, Pt voids per urinal. Bed in low position, side rails up x 2. Call light in reach, bed alarm on and wheels locked. Will continue to monitor.  Family at bedside

## 2017-02-09 NOTE — PLAN OF CARE
Problem: Patient Care Overview  Goal: Plan of Care Review  Outcome: Ongoing (interventions implemented as appropriate)  Alert confused. Sitter at bedside. Pt yelling at sitter and trying to get out of bed. given haldol 5 mg ivp with relief noted.  O2 2L NC in place tolerating well. Fall and injury free this shift. Bed in low position and locked. Call bell in reach  Side rails up x 2.  Plan of care review with patient. Did not voice understanding.  Telemetry

## 2017-02-09 NOTE — PT/OT/SLP EVAL
Physical Therapy  Evaluation    Thierry Fu   MRN: [id number]   Admitting Diagnosis: Mild intermittent asthma with acute exacerbation    PT Received On: 17  PT Start Time: 0816     PT Stop Time: 0840    PT Total Time (min): 24 min       Billable Minutes:  Evaluation 10 and Therapeutic Activity 14    Diagnosis: Mild intermittent asthma with acute exacerbation      Past Medical History   Diagnosis Date    Asthma     CHF (congestive heart failure)     Coronary artery disease      6 stents    Dementia     Diabetes mellitus     Dyslipidemia     Hyperlipidemia     Hypertension     Pacemaker       Past Surgical History   Procedure Laterality Date    Coronary angioplasty with stent placement  pacemaker    Total hip arthroplasty  2013 Left    Hernia repair      Cardiac pacemaker placement      Colonoscopy N/A 3/28/2016     Procedure: COLONOSCOPY;  Surgeon: Del Ruvalcaba MD;  Location: [facility];  Service: Endoscopy;  Laterality: N/A;       Referring physician: Henry  Date referred to PT: 2017    General Precautions: Standard, fall  Orthopedic Precautions: N/A   Braces: N/A            Patient History:  Lives With: alone (with sitters)  DME owned (not currently used):     Previous Level of Function:  Ambulation Skills: needs assist  Transfer Skills: needs assist  ADL Skills: needs assist    Subjective:  Communicated with nurse Briceno and AP Briceno prior to session.  Pt seen supine in bed, alert, confusional, calm and agreeable to PT  Chief Complaint: none  Patient goals: unable    Pain Ratin/10                    Objective:   Patient found with: oxygen, telemetry     Cognitive Exam:  Oriented to: disoriented, know name, 194    Follows Commands/attention: Follows one-step commands  Communication: clear/fluent  Safety awareness/insight to disability: impaired    Physical Exam:  Postural examination/scapula alignment: Rounded shoulder and Head forward    Skin integrity: Dry  Edema:      Sensation:   Intact    Upper Extremity Range of Motion:  Right Upper Extremity: see OT notes  Left Upper Extremity:     Upper Extremity Strength:  Right Upper Extremity:   Left Upper Extremity:     Lower Extremity Range of Motion:  Right Lower Extremity: WFL  Left Lower Extremity: WFL    Lower Extremity Strength:  Right Lower Extremity: 4-/5  Left Lower Extremity: 4-/5     Fine motor coordination:      Gross motor coordination:     Functional Mobility:  Bed Mobility:  Rolling/Turning to Left: Minimum assistance  Supine to Sit: Moderate Assistance    Transfers:  Sit <> Stand Assistance: Minimum Assistance  Sit <> Stand Assistive Device: No Assistive Device  Bed <> Chair Technique: Stand Pivot  Bed <> Chair Assistance: Moderate Assistance  Bed <> Chair Assistive Device: No Assistive Device    Gait:   Gait Distance: stood for diaper change- few steps to the bedside chair  Assistance 1: Moderate assistance  Gait Assistive Device: Hand held assist    Stairs:      Balance:   Static Sit: FAIR: Maintains without assist, but unable to take any challenges   Dynamic Sit: FAIR: Cannot move trunk without losing balance  Static Stand: POOR: Needs MODERATE assist to maintain  Dynamic stand: POOR: N/A    Therapeutic Activities and Exercises:  EOB sitting,incontinent of urine  Stood for diaper change with AP Briceno  Few steps to chair O2 at 2 liters with slight SOB with exertion, SAT 88-90%  Pt up in chair with tray table in front with set up help for breakfast- pt feeding self  Nurse Kaity made aware    AM-PAC 6 CLICK MOBILITY  How much help from another person does this patient currently need?   1 = Unable, Total/Dependent Assistance  2 = A lot, Maximum/Moderate Assistance  3 = A little, Minimum/Contact Guard/Supervision  4 = None, Modified [place]/Independent          AM-PAC Raw Score CMS G-Code Modifier Level of Impairment Assistance   6 % Total / Unable   7 - 9 CM 80 - 100% Maximal Assist   10 - 14 CL 60 - 80%  Moderate Assist   15 - 19 CK 40 - 60% Moderate Assist   20 - 22 CJ 20 - 40% Minimal Assist   23 CI 1-20% SBA / CGA   24 CH 0% Independent/ Mod I     Patient left up in chair with all lines intact, call button in reach and CNA Kaity present.    Assessment:   Thierry Fu is a 87 y.o. male with a medical diagnosis of Mild intermittent asthma with acute exacerbation and presents with deconditioning, confusional,SOB with exertion 88-90% with O2.. Pt will benefit from continued therapies, 24 hr supervision/SNF.    Rehab identified problem list/impairments: Rehab identified problem list/impairments: weakness, impaired endurance, impaired self care skills, impaired functional mobilty, impaired cognition, impaired balance, gait instability, decreased lower extremity function, decreased safety awareness    Rehab potential is fair.    Activity tolerance: Fair    Discharge recommendations: Discharge Facility/Level Of Care Needs: home health PT /SNF    Barriers to discharge:      Equipment recommendations:       GOALS:   Physical Therapy Goals        Problem: Physical Therapy Goal    Goal Priority Disciplines Outcome Goal Variances Interventions   Physical Therapy Goal    High PT/OT, PT      Description:  Goals to be met by: 2017     Patient will increase functional independence with mobility by performin. Supine to sit with MInimal Assistance  2. Sit to stand transfer with Minimal Assistance  3. Bed to chair transfer with Minimal Assistance using Rolling Walker  4. Gait  x 50 feet with Minimal Assistance using Rolling Walker.   5. Lower extremity exercise program x10 reps per handout, with assistance as needed                PLAN:    Patient to be seen daily to address the above listed problems via gait training, therapeutic activities, therapeutic exercises  Plan of Care expires: 17  Plan of Care reviewed with: patient          Maria Isabel Corea, PT  2017

## 2017-02-09 NOTE — PT/OT/SLP PROGRESS
Physical Therapy  Treatment    Thierry Fu   MRN: [id number]   Admitting Diagnosis: Mild intermittent asthma with acute exacerbation    PT Received On: 17  PT Start Time: 1429     PT Stop Time: 1452    PT Total Time (min): 23 min       Billable Minutes:  Gait [id number] and Therapeutic Activity 10    Treatment Type: Treatment  PT/PTA: PT             General Precautions: Standard, fall  Orthopedic Precautions: N/A   Braces: N/A         Subjective:  Communicated with nurse flores prior to session.  AP Briceno present  Pt seen supine in bed, daughter along with Dr Barrow at bedside  Pt agreeable to PT    Pain Ratin/10                   Objective:   Patient found with: oxygen, bed alarm    Functional Mobility:  Bed Mobility:   Rolling/Turning to Left: Minimum assistance  Rolling/Turning Right: Maximum assistance  Supine to Sit: Moderate Assistance, Maximum Assistance  Sit to Supine: Moderate Assistance    Transfers:  Sit <> Stand Assistance: Moderate Assistance  Sit <> Stand Assistive Device: Rolling Walker  Bed <> Chair Technique: Stand Pivot  Bed <> Chair Assistance: Moderate Assistance  Bed <> Chair Assistive Device: No Assistive Device    Gait:   Gait Distance: 20ft with chair following, episodes of knee giveaway, O2 at 3 liters  Assistance 1: Moderate assistance  Gait Assistive Device: Rolling walker  Gait Pattern: 2-point gait  Gait Deviation(s): decreased gabriela, decreased velocity of limb motion, decreased step length    Stairs:      Balance:   Static Sit: FAIR-: Maintains without assist but inconsistent   Dynamic Sit: FAIR: Cannot move trunk without losing balance  Static Stand: FAIR: Maintains without assist but unable to take challenges  Dynamic stand: POOR: N/A     Therapeutic Activities and Exercises:  EOB sitting with extra time for midline positioning  Pt incontinent of urine,diaper changed in standing with assist of CNA  Progressed to gait training with RW/O2 at 3 liters with SCOOTER's  giveaway  Back to bed with all needs within reach     AM-PAC 6 CLICK MOBILITY  How much help from another person does this patient currently need?   1 = Unable, Total/Dependent Assistance  2 = A lot, Maximum/Moderate Assistance  3 = A little, Minimum/Contact Guard/Supervision  4 = None, Modified [place]/Independent         AM-PAC Raw Score CMS G-Code Modifier Level of Impairment Assistance   6 % Total / Unable   7 - 9 CM 80 - 100% Maximal Assist   10 - 14 CL 60 - 80% Moderate Assist   15 - 19 CK 40 - 60% Moderate Assist   20 - 22 CJ 20 - 40% Minimal Assist   23 CI 1-20% SBA / CGA   24 CH 0% Independent/ Mod I     Patient left supine with all lines intact, call button in reach and bed alarm on.    Assessment:  Thierry Fu is a 87 y.o. male with a medical diagnosis of Mild intermittent asthma with acute exacerbation and presents with gross weakness, impaired functional mobility and a high fall risk due to LE's giveaway. Pt to benefit from 24 hr supervision/ continued therapies SNF. PT increased BID    Rehab identified problem list/impairments: Rehab identified problem list/impairments: weakness, impaired endurance, gait instability, impaired functional mobilty, impaired balance, decreased lower extremity function, decreased safety awareness, impaired cardiopulmonary response to activity    Rehab potential is fair.    Activity tolerance: Fair    Discharge recommendations: Discharge Facility/Level Of Care Needs: nursing facility, skilled     Barriers to discharge:      Equipment recommendations:       GOALS:   Physical Therapy Goals        Problem: Physical Therapy Goal    Goal Priority Disciplines Outcome Goal Variances Interventions   Physical Therapy Goal    High PT/OT, PT      Description:  Goals to be met by: 2017     Patient will increase functional independence with mobility by performin. Supine to sit with MInimal Assistance  2. Sit to stand transfer with Minimal Assistance  3.  Bed to chair transfer with Minimal Assistance using Rolling Walker  4. Gait  x 50 feet with Minimal Assistance using Rolling Walker.   5. Lower extremity exercise program x10 reps per handout, with assistance as needed                PLAN:    Patient to be seen BID  to address the above listed problems via gait training, therapeutic activities, therapeutic exercises  Plan of Care expires: 02/16/17  Plan of Care reviewed with: patient, daughter         Maria Isabel Corea, PT  02/09/2017

## 2017-02-09 NOTE — PROGRESS NOTES
Home 02 Evaluation     Patient does not ambulate    At rest on room air 02 saturation is 88%  At rest on 3lnc 02 saturation is 92%

## 2017-02-09 NOTE — PLAN OF CARE
Problem: Patient Care Overview  Goal: Plan of Care Review  Patient aerosol Q6 given via msk tolerated well sats 94% on 2lpm

## 2017-02-10 LAB
POCT GLUCOSE: 221 MG/DL (ref 70–110)
POCT GLUCOSE: 242 MG/DL (ref 70–110)
POCT GLUCOSE: 244 MG/DL (ref 70–110)

## 2017-02-10 PROCEDURE — 27000221 HC OXYGEN, UP TO 24 HOURS

## 2017-02-10 PROCEDURE — 25000003 PHARM REV CODE 250: Performed by: NURSE PRACTITIONER

## 2017-02-10 PROCEDURE — 94761 N-INVAS EAR/PLS OXIMETRY MLT: CPT

## 2017-02-10 PROCEDURE — 25000242 PHARM REV CODE 250 ALT 637 W/ HCPCS: Performed by: EMERGENCY MEDICINE

## 2017-02-10 PROCEDURE — 97116 GAIT TRAINING THERAPY: CPT

## 2017-02-10 PROCEDURE — 63600175 PHARM REV CODE 636 W HCPCS: Performed by: HOSPITALIST

## 2017-02-10 PROCEDURE — 94640 AIRWAY INHALATION TREATMENT: CPT

## 2017-02-10 PROCEDURE — 63600175 PHARM REV CODE 636 W HCPCS: Performed by: EMERGENCY MEDICINE

## 2017-02-10 PROCEDURE — 63600175 PHARM REV CODE 636 W HCPCS: Performed by: NURSE PRACTITIONER

## 2017-02-10 PROCEDURE — 25000003 PHARM REV CODE 250: Performed by: EMERGENCY MEDICINE

## 2017-02-10 PROCEDURE — 97110 THERAPEUTIC EXERCISES: CPT

## 2017-02-10 PROCEDURE — 11000001 HC ACUTE MED/SURG PRIVATE ROOM

## 2017-02-10 RX ORDER — ZIPRASIDONE MESYLATE 20 MG/ML
10 INJECTION, POWDER, LYOPHILIZED, FOR SOLUTION INTRAMUSCULAR EVERY 12 HOURS PRN
Status: DISCONTINUED | OUTPATIENT
Start: 2017-02-10 | End: 2017-02-16 | Stop reason: HOSPADM

## 2017-02-10 RX ORDER — HALOPERIDOL 5 MG/ML
10 INJECTION INTRAMUSCULAR ONCE
Status: COMPLETED | OUTPATIENT
Start: 2017-02-10 | End: 2017-02-10

## 2017-02-10 RX ADMIN — VALSARTAN 160 MG: 80 TABLET ORAL at 08:02

## 2017-02-10 RX ADMIN — ATORVASTATIN CALCIUM 40 MG: 40 TABLET, FILM COATED ORAL at 08:02

## 2017-02-10 RX ADMIN — IPRATROPIUM BROMIDE AND ALBUTEROL SULFATE 3 ML: .5; 3 SOLUTION RESPIRATORY (INHALATION) at 07:02

## 2017-02-10 RX ADMIN — METHYLPREDNISOLONE SODIUM SUCCINATE 80 MG: 125 INJECTION, POWDER, FOR SOLUTION INTRAMUSCULAR; INTRAVENOUS at 06:02

## 2017-02-10 RX ADMIN — CLOPIDOGREL BISULFATE 75 MG: 75 TABLET ORAL at 08:02

## 2017-02-10 RX ADMIN — IPRATROPIUM BROMIDE AND ALBUTEROL SULFATE 3 ML: .5; 3 SOLUTION RESPIRATORY (INHALATION) at 12:02

## 2017-02-10 RX ADMIN — INSULIN ASPART 1 UNITS: 100 INJECTION, SOLUTION INTRAVENOUS; SUBCUTANEOUS at 09:02

## 2017-02-10 RX ADMIN — METHYLPREDNISOLONE SODIUM SUCCINATE 80 MG: 125 INJECTION, POWDER, FOR SOLUTION INTRAMUSCULAR; INTRAVENOUS at 10:02

## 2017-02-10 RX ADMIN — METHYLPREDNISOLONE SODIUM SUCCINATE 80 MG: 125 INJECTION, POWDER, FOR SOLUTION INTRAMUSCULAR; INTRAVENOUS at 01:02

## 2017-02-10 RX ADMIN — INSULIN ASPART 2 UNITS: 100 INJECTION, SOLUTION INTRAVENOUS; SUBCUTANEOUS at 05:02

## 2017-02-10 RX ADMIN — ISOSORBIDE MONONITRATE 120 MG: 60 TABLET, EXTENDED RELEASE ORAL at 08:02

## 2017-02-10 RX ADMIN — SOTALOL HYDROCHLORIDE 120 MG: 80 TABLET ORAL at 08:02

## 2017-02-10 RX ADMIN — HALOPERIDOL LACTATE 5 MG: 5 INJECTION, SOLUTION INTRAMUSCULAR at 08:02

## 2017-02-10 RX ADMIN — HALOPERIDOL LACTATE 5 MG: 5 INJECTION, SOLUTION INTRAMUSCULAR at 04:02

## 2017-02-10 RX ADMIN — RIVASTIGMINE TRANSDERMAL SYSTEM 1 PATCH: 9.5 PATCH, EXTENDED RELEASE TRANSDERMAL at 08:02

## 2017-02-10 RX ADMIN — INSULIN ASPART 2 UNITS: 100 INJECTION, SOLUTION INTRAVENOUS; SUBCUTANEOUS at 06:02

## 2017-02-10 RX ADMIN — HALOPERIDOL LACTATE 10 MG: 5 INJECTION, SOLUTION INTRAMUSCULAR at 07:02

## 2017-02-10 RX ADMIN — DULOXETINE 60 MG: 30 CAPSULE, DELAYED RELEASE ORAL at 08:02

## 2017-02-10 RX ADMIN — QUETIAPINE FUMARATE 50 MG: 25 TABLET, FILM COATED ORAL at 08:02

## 2017-02-10 RX ADMIN — IPRATROPIUM BROMIDE AND ALBUTEROL SULFATE 3 ML: .5; 3 SOLUTION RESPIRATORY (INHALATION) at 08:02

## 2017-02-10 RX ADMIN — INSULIN DETEMIR 15 UNITS: 100 INJECTION, SOLUTION SUBCUTANEOUS at 08:02

## 2017-02-10 RX ADMIN — PANTOPRAZOLE SODIUM 40 MG: 40 TABLET, DELAYED RELEASE ORAL at 08:02

## 2017-02-10 RX ADMIN — INSULIN ASPART 2 UNITS: 100 INJECTION, SOLUTION INTRAVENOUS; SUBCUTANEOUS at 11:02

## 2017-02-10 NOTE — PROGRESS NOTES
Met with patient's daughter, Deisy, at bedside.  Deisy was aware that patient was denied by [facility].  Deisy stated to send referrals to [facility] and [facility].    Referrals sent via [facility].

## 2017-02-10 NOTE — PROGRESS NOTES
02/09/17 1916   Patient Assessment/Suction   Level of Consciousness (AVPU) responds to voice   Respiratory Effort Mouth breathing   All Lung Fields Breath Sounds diminished   Aerosol Therapy   $ Aerosol Therapy Charges Aerosol Treatment   Respiratory Treatment Status given   SVN/Inhaler Treatment Route mask;with oxygen   Position During Treatment HOB at 60 degrees   Patient Tolerance good   Post-Treatment   Post-treatment Heart Rate (beats/min) 74   Post-treatment Resp Rate (breaths/min) 18   All Fields Breath Sounds aeration increased

## 2017-02-10 NOTE — NURSING
Pt's is agitated and confused, tried to get up on bed. Prn haldol given. Still patients trying to get up and tried to remove his IV line.Informed the family, verbal consent taken for non-violent restraint.

## 2017-02-10 NOTE — PLAN OF CARE
Problem: Physical Therapy Goal  Goal: Physical Therapy Goal  Goals to be met by: 2017     Patient will increase functional independence with mobility by performin. Supine to sit with MInimal Assistance  2. Sit to stand transfer with Minimal Assistance  3. Bed to chair transfer with Minimal Assistance using Rolling Walker  4. Gait x 50 feet with Minimal Assistance using Rolling Walker.   5. Lower extremity exercise program x10 reps per handout, with assistance as needed   Outcome: Ongoing (interventions implemented as appropriate)  Performed AROM all 4 extremities at 10 reps each supine in bed.

## 2017-02-10 NOTE — PLAN OF CARE
Problem: Patient Care Overview  Goal: Plan of Care Review  Outcome: Ongoing (interventions implemented as appropriate)  Pt free from falls or injury, Pt denies pain, Pt did cough some during night non productive. Pt denies N/V, Family provided sitter at bedside entire shift. Pt adjusts self in bed, Pt incontinent a majority of time. Pt does not drink much. Pts VS stable, Pts BG monitored. Call light in reach, bed in low position, wheels locked. Pt and sitter educated on fluid management R/T CHF, medication safety and affects.

## 2017-02-10 NOTE — PROGRESS NOTES
[facility] Hospital Medicine  Progress Note    Patient Name: Thierry Fu  MRN: [id number]  Patient Class: IP- Inpatient   Admission Date: 2/7/2017  Length of Stay: 3 days  Attending Physician: Stacy Figueroa MD  Primary Care Provider: Rosendo Barrow Jr, MD        Subjective:     Principal Problem:Mild intermittent asthma with acute exacerbation    HPI:  Mr. Fu is an 88 yo male  admitted to the services of hospital medicine via the ER for asthma exacerbation. Unable to obtain history and ROS from pt due to dementia. History obtained from ER physician, family and previous epic records. Presents with CC of non-productive cough x 10 days. Associated with wheezing, SOB which are exacerbated by lying down. Started bronchodilator for wheezing and corticosteroids 3 days ago. No improvement noted. Remote history of smoking, quit 60 years ago. History of CAD and PPM. Diabetes type II controlled on oral medications.  Other past medical history as listed below.     Hospital Course:  Pt given supplemental O2, steroids and nebs. Cardiology evaluated patient for pacemake interrogation.     Interval History: uneventful night. Awaiting SNF placement.     Review of Systems   Constitutional: Negative for chills and fever.   Respiratory: Negative for shortness of breath.    Cardiovascular: Negative for chest pain.   Gastrointestinal: Negative for abdominal pain.   Genitourinary: Negative for dysuria.   Neurological: Negative for headaches.     Objective:     Vital Signs (Most Recent):  Temp: 98 °F (36.7 °C) (02/10/17 1209)  Pulse: 72 (02/10/17 1251)  Resp: 18 (02/10/17 1251)  BP: (!) 142/66 (02/10/17 1209)  SpO2: (!) 91 % (02/10/17 1251) Vital Signs (24h Range):  Temp:  [97.8 °F (36.6 °C)-98.1 °F (36.7 °C)] 98 °F (36.7 °C)  Pulse:  [66-84] 72  Resp:  [16-20] 18  SpO2:  [88 %-98 %] 91 %  BP: (137-180)/(65-79) 142/66     Weight: 95.3 kg (210 lb)  Body mass index is 28.48 kg/(m^2).    Intake/Output  Summary (Last 24 hours) at 02/10/17 1514  Last data filed at 02/09/17 1700   Gross per 24 hour   Intake              540 ml   Output              650 ml   Net             -110 ml      Physical Exam   Constitutional: He appears well-developed and well-nourished. No distress.   HENT:   Head: Normocephalic and atraumatic.   Eyes: Conjunctivae are normal.   Neck: Neck supple. No JVD present.   Cardiovascular: Normal rate, regular rhythm and normal heart sounds.    Pulmonary/Chest: Effort normal and breath sounds normal. No respiratory distress.   Abdominal: Soft. Bowel sounds are normal. He exhibits no distension.   Musculoskeletal: He exhibits no edema.   Neurological: He is alert.   Psychiatric: He has a normal mood and affect. His speech is normal. He is agitated.   Pt verbalizes he does not want to stay in bed. He would like to move around more.    Vitals reviewed.      Significant Labs:   Lipid Panel:   Recent Labs  Lab 02/09/17  0428   CHOL 150   HDL 43   LDLCALC 96.4   TRIG 53   CHOLHDL 28.7       Significant Imaging: I have reviewed all pertinent imaging results/findings within the past 24 hours.    Assessment/Plan:      * Mild intermittent asthma with acute exacerbation  Improved/ stable. Continue IV steroids, duonebs and supplemental O2. Pt will need continuous home O2.           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 Continue home meds.         Pacemaker  Interrogation done. Working properly.       Type 2 diabetes mellitus with circulatory disorder  Stable. Continue basal and SSI.      VTE Risk Mitigation         Ordered     Medium Risk of VTE  Once      02/07/17 2322          Stacy Figueroa MD  Department of Hospital Medicine   [facility]

## 2017-02-10 NOTE — PLAN OF CARE
"Problem: Patient Care Overview  Goal: Plan of Care Review  Outcome: Ongoing (interventions implemented as appropriate)  Remains confused throughout the day but able to redirect. Wants to "go home". Tried to remove the IV line  Participated in therapy walked in the hallway with assistance, free from injuries/falls.Tolerated well.   Vs stable, remains in paced rhythmn.  On Venti mask 12L Spo2 93%  Bed alarm activated while in bed- tries to get up but too weak to stand without assistance   Bedside rail up, call light in reach. Family at bedside  Will continue to monitor    "

## 2017-02-10 NOTE — NURSING
Pt's SPO2 88% on 3L nasal cannula, replaced with venti mask on 12L SPO2 92%. Informed Dr. mauricio

## 2017-02-10 NOTE — SUBJECTIVE & OBJECTIVE
Interval History: uneventful night. Awaiting SNF placement.     Review of Systems   Constitutional: Negative for chills and fever.   Respiratory: Negative for shortness of breath.    Cardiovascular: Negative for chest pain.   Gastrointestinal: Negative for abdominal pain.   Genitourinary: Negative for dysuria.   Neurological: Negative for headaches.     Objective:     Vital Signs (Most Recent):  Temp: 98 °F (36.7 °C) (02/10/17 1209)  Pulse: 72 (02/10/17 1251)  Resp: 18 (02/10/17 1251)  BP: (!) 142/66 (02/10/17 1209)  SpO2: (!) 91 % (02/10/17 1251) Vital Signs (24h Range):  Temp:  [97.8 °F (36.6 °C)-98.1 °F (36.7 °C)] 98 °F (36.7 °C)  Pulse:  [66-84] 72  Resp:  [16-20] 18  SpO2:  [88 %-98 %] 91 %  BP: (137-180)/(65-79) 142/66     Weight: 95.3 kg (210 lb)  Body mass index is 28.48 kg/(m^2).    Intake/Output Summary (Last 24 hours) at 02/10/17 1514  Last data filed at 02/09/17 1700   Gross per 24 hour   Intake              540 ml   Output              650 ml   Net             -110 ml      Physical Exam   Constitutional: He appears well-developed and well-nourished. No distress.   HENT:   Head: Normocephalic and atraumatic.   Eyes: Conjunctivae are normal.   Neck: Neck supple. No JVD present.   Cardiovascular: Normal rate, regular rhythm and normal heart sounds.    Pulmonary/Chest: Effort normal and breath sounds normal. No respiratory distress.   Abdominal: Soft. Bowel sounds are normal. He exhibits no distension.   Musculoskeletal: He exhibits no edema.   Neurological: He is alert.   Psychiatric: He has a normal mood and affect. His speech is normal. He is agitated.   Pt verbalizes he does not want to stay in bed. He would like to move around more.    Vitals reviewed.      Significant Labs:   Lipid Panel:   Recent Labs  Lab 02/09/17  0428   CHOL 150   HDL 43   LDLCALC 96.4   TRIG 53   CHOLHDL 28.7       Significant Imaging: I have reviewed all pertinent imaging results/findings within the past 24 hours.

## 2017-02-10 NOTE — PLAN OF CARE
Problem: Physical Therapy Goal  Goal: Physical Therapy Goal  Goals to be met by: 2017     Patient will increase functional independence with mobility by performin. Supine to sit with MInimal Assistance  2. Sit to stand transfer with Minimal Assistance  3. Bed to chair transfer with Minimal Assistance using Rolling Walker  4. Gait x 50 feet with Minimal Assistance using Rolling Walker.   5. Lower extremity exercise program x10 reps per handout, with assistance as needed   Outcome: Ongoing (interventions implemented as appropriate)  Ambulated 40' x 2 with rw and Mod A with chair follow. O2 attached @ 15L venti mask and RT ( Shannon ) present.

## 2017-02-10 NOTE — PT/OT/SLP PROGRESS
Physical Therapy  Treatment    Thierry Fu   MRN: [id number]   Admitting Diagnosis: Mild intermittent asthma with acute exacerbation    PT Received On: 02/10/17  PT Start Time: 0840     PT Stop Time: 0855    PT Total Time (min): 15 min       Billable Minutes:  Therapeutic Exercise 15min    Treatment Type: Treatment  PT/PTA: PTA     PTA Visit Number: 1       General Precautions: Standard, fall  Orthopedic Precautions: N/A   Braces:           Subjective:  Communicated with nurse Mendosa prior to session.  Patient requesting to get out of here.    Pain Rating: other (see comments) (reported no pain.)                   Objective:   Patient found with: bed alarm, oxygen    Functional Mobility:  Bed Mobility:        Transfers:       Gait:   Gait Distance: no gait this session  (nurse reports patient received haladol, held ambulation . Will reattempt later.)    Stairs:      Balance:   Static Sit:   Dynamic Sit:   Static Stand:   Dynamic stand:      Therapeutic Activities and Exercises:  Supine in bed patient awake and agreed to exercise. Performed ROM all 4 extremities at 10 reps each ( actively counting tending to doze off occasionally).  Positioned for comfort and needs within reach.    AM-PAC 6 CLICK MOBILITY  How much help from another person does this patient currently need?   1 = Unable, Total/Dependent Assistance  2 = A lot, Maximum/Moderate Assistance  3 = A little, Minimum/Contact Guard/Supervision  4 = None, Modified [place]/Independent         AM-PAC Raw Score CMS G-Code Modifier Level of Impairment Assistance   6 % Total / Unable   7 - 9 CM 80 - 100% Maximal Assist   10 - 14 CL 60 - 80% Moderate Assist   15 - 19 CK 40 - 60% Moderate Assist   20 - 22 CJ 20 - 40% Minimal Assist   23 CI 1-20% SBA / CGA   24 CH 0% Independent/ Mod I     Patient left supine with all lines intact, call button in reach, bed alarm on and nurse Deondre notified.    Assessment:  Thierry Fu is a 87 y.o. male  with a medical diagnosis of Mild intermittent asthma with acute exacerbation and presents with weakness, decreased alertness.    Rehab identified problem list/impairments: Rehab identified problem list/impairments: weakness, impaired endurance, impaired cognition, decreased safety awareness, impaired cardiopulmonary response to activity    Rehab potential is fair.    Activity tolerance: Fair    Discharge recommendations: Discharge Facility/Level Of Care Needs: nursing facility, skilled     Barriers to discharge:      Equipment recommendations:       GOALS:   Physical Therapy Goals        Problem: Physical Therapy Goal    Goal Priority Disciplines Outcome Goal Variances Interventions   Physical Therapy Goal    High PT/OT, PT Ongoing (interventions implemented as appropriate)     Description:  Goals to be met by: 2017     Patient will increase functional independence with mobility by performin. Supine to sit with MInimal Assistance  2. Sit to stand transfer with Minimal Assistance  3. Bed to chair transfer with Minimal Assistance using Rolling Walker  4. Gait  x 50 feet with Minimal Assistance using Rolling Walker.   5. Lower extremity exercise program x10 reps per handout, with assistance as needed                PLAN:    Patient to be seen BID  to address the above listed problems via gait training, therapeutic activities, therapeutic exercises  Plan of Care expires: 17  Plan of Care reviewed with: patient         Selin Justino, PTA  02/10/2017

## 2017-02-10 NOTE — ASSESSMENT & PLAN NOTE
Improved/ stable. Continue IV steroids, duonebs and supplemental O2. Pt will need continuous home O2.

## 2017-02-10 NOTE — PROGRESS NOTES
2541  Contacted [facility] to f/u on consult; per Ariana they did receive the referral and her DON is reviewing it.    1003  Contacted [facility] [phone] to f/u; no male beds available.    1130  Call received from Ariana at [facility] stating that they are unable to meet pt's needs.    1133  Left voicemail for Dr Barrow.

## 2017-02-10 NOTE — PROGRESS NOTES
Referrals sent to [facility] and [facility] were unsuccessful through [facility].  Jessi at [facility] will pull from epic.  Faxed hard copy to [facility].

## 2017-02-10 NOTE — PT/OT/SLP PROGRESS
Physical Therapy  Treatment    Thierry Fu   MRN: [id number]   Admitting Diagnosis: Mild intermittent asthma with acute exacerbation    PT Received On: 02/10/17  PT Start Time: 1325     PT Stop Time: 1340    PT Total Time (min): 15 min       Billable Minutes:  Gait [id number] and Therapeutic Activity 5min    Treatment Type: Treatment  PT/PTA: PTA     PTA Visit Number: 2       General Precautions: Standard, fall  Orthopedic Precautions: N/A   Braces:           Subjective:  Communicated with nurse Mendosa prior to session.  Patient awake , alert and agreed to ambulate.    Pain Ratin/10                   Objective:   Patient found with: bed alarm, oxygen    Functional Mobility:  Bed Mobility:   Rolling/Turning Right: Minimum assistance  Supine to Sit: Moderate Assistance  Sit to Supine: Moderate Assistance    Transfers:  Sit <> Stand Assistance: Moderate Assistance  Sit <> Stand Assistive Device: Rolling Walker    Gait:   Gait Distance: 40' x 2 with chair follow and O2 @ 15 L venti mask.  Assistance 1: Moderate assistance  Gait Assistive Device: Rolling walker  Gait Pattern: 3-point gait  Gait Deviation(s): decreased gabriela, decreased velocity of limb motion, decreased step length, decreased toe-to-floor clearance    Stairs:      Balance:   Static Sit: FAIR: Maintains without assist, but unable to take any challenges   Dynamic Sit: FAIR+: Maintains balance through MINIMAL excursions of active trunk motion  Static Stand: FAIR: Maintains without assist but unable to take challenges  Dynamic stand: POOR: N/A     Therapeutic Activities and Exercises:  Transferred to sitting EOB with AJacquie Gonzalez) present to assess O2 SATS. Ambulated 40' x 2 with rw and Mod A ( Max A during turns due to LE weakness) with O2 attached @ 15L venti mask and chair follow. Requires increased verbal cues for safety with activity. Has episodes of wobbly legs during gait. Returned to room to bed R sidelying with bed alarm activated and  family present.    AM-PAC 6 CLICK MOBILITY  How much help from another person does this patient currently need?   1 = Unable, Total/Dependent Assistance  2 = A lot, Maximum/Moderate Assistance  3 = A little, Minimum/Contact Guard/Supervision  4 = None, Modified [place]/Independent         AM-PAC Raw Score CMS G-Code Modifier Level of Impairment Assistance   6 % Total / Unable   7 - 9 CM 80 - 100% Maximal Assist   10 - 14 CL 60 - 80% Moderate Assist   15 - 19 CK 40 - 60% Moderate Assist   20 - 22 CJ 20 - 40% Minimal Assist   23 CI 1-20% SBA / CGA   24 CH 0% Independent/ Mod I     Patient left right sidelying with all lines intact, call button in reach, bed alarm on, nurse Deondre notified and family present.    Assessment:  Thierry Fu is a 87 y.o. male with a medical diagnosis of Mild intermittent asthma with acute exacerbation and presents with weakness, limited endurance , SOB.    Rehab identified problem list/impairments: Rehab identified problem list/impairments: weakness, impaired endurance, impaired cardiopulmonary response to activity, decreased safety awareness, impaired cognition, impaired functional mobilty, gait instability    Rehab potential is fair.    Activity tolerance: Fair    Discharge recommendations: Discharge Facility/Level Of Care Needs: nursing facility, skilled     Barriers to discharge:      Equipment recommendations:       GOALS:   Physical Therapy Goals        Problem: Physical Therapy Goal    Goal Priority Disciplines Outcome Goal Variances Interventions   Physical Therapy Goal    High PT/OT, PT Ongoing (interventions implemented as appropriate)     Description:  Goals to be met by: 2017     Patient will increase functional independence with mobility by performin. Supine to sit with MInimal Assistance  2. Sit to stand transfer with Minimal Assistance  3. Bed to chair transfer with Minimal Assistance using Rolling Walker  4. Gait  x 50 feet with Minimal  Assistance using Rolling Walker.   5. Lower extremity exercise program x10 reps per handout, with assistance as needed                PLAN:    Patient to be seen BID  to address the above listed problems via gait training, therapeutic activities, therapeutic exercises  Plan of Care expires: 02/16/17  Plan of Care reviewed with: patient, caregiver         Selin Badillo PTA  02/10/2017

## 2017-02-11 PROBLEM — K59.01 SLOW TRANSIT CONSTIPATION: Status: ACTIVE | Noted: 2017-02-11

## 2017-02-11 LAB
POCT GLUCOSE: 211 MG/DL (ref 70–110)
POCT GLUCOSE: 239 MG/DL (ref 70–110)
POCT GLUCOSE: 257 MG/DL (ref 70–110)
POCT GLUCOSE: 268 MG/DL (ref 70–110)

## 2017-02-11 PROCEDURE — 63600175 PHARM REV CODE 636 W HCPCS: Performed by: NURSE PRACTITIONER

## 2017-02-11 PROCEDURE — 97116 GAIT TRAINING THERAPY: CPT

## 2017-02-11 PROCEDURE — 25000003 PHARM REV CODE 250: Performed by: EMERGENCY MEDICINE

## 2017-02-11 PROCEDURE — 63600175 PHARM REV CODE 636 W HCPCS: Performed by: HOSPITALIST

## 2017-02-11 PROCEDURE — 25000242 PHARM REV CODE 250 ALT 637 W/ HCPCS: Performed by: EMERGENCY MEDICINE

## 2017-02-11 PROCEDURE — 63600175 PHARM REV CODE 636 W HCPCS: Performed by: INTERNAL MEDICINE

## 2017-02-11 PROCEDURE — 63600175 PHARM REV CODE 636 W HCPCS: Performed by: EMERGENCY MEDICINE

## 2017-02-11 PROCEDURE — 25000003 PHARM REV CODE 250: Performed by: INTERNAL MEDICINE

## 2017-02-11 PROCEDURE — 94761 N-INVAS EAR/PLS OXIMETRY MLT: CPT

## 2017-02-11 PROCEDURE — 94640 AIRWAY INHALATION TREATMENT: CPT

## 2017-02-11 PROCEDURE — 27000221 HC OXYGEN, UP TO 24 HOURS

## 2017-02-11 PROCEDURE — 25000003 PHARM REV CODE 250: Performed by: NURSE PRACTITIONER

## 2017-02-11 PROCEDURE — 11000001 HC ACUTE MED/SURG PRIVATE ROOM

## 2017-02-11 RX ORDER — POLYETHYLENE GLYCOL 3350 17 G/17G
17 POWDER, FOR SOLUTION ORAL DAILY
Status: DISCONTINUED | OUTPATIENT
Start: 2017-02-11 | End: 2017-02-16 | Stop reason: HOSPADM

## 2017-02-11 RX ADMIN — POLYETHYLENE GLYCOL (3350) 17 G: 17 POWDER, FOR SOLUTION ORAL at 11:02

## 2017-02-11 RX ADMIN — QUETIAPINE FUMARATE 50 MG: 25 TABLET, FILM COATED ORAL at 08:02

## 2017-02-11 RX ADMIN — INSULIN DETEMIR 15 UNITS: 100 INJECTION, SOLUTION SUBCUTANEOUS at 08:02

## 2017-02-11 RX ADMIN — DULOXETINE 60 MG: 30 CAPSULE, DELAYED RELEASE ORAL at 08:02

## 2017-02-11 RX ADMIN — HALOPERIDOL LACTATE 5 MG: 5 INJECTION, SOLUTION INTRAMUSCULAR at 03:02

## 2017-02-11 RX ADMIN — VALSARTAN 160 MG: 80 TABLET ORAL at 08:02

## 2017-02-11 RX ADMIN — INSULIN ASPART 1 UNITS: 100 INJECTION, SOLUTION INTRAVENOUS; SUBCUTANEOUS at 08:02

## 2017-02-11 RX ADMIN — IPRATROPIUM BROMIDE AND ALBUTEROL SULFATE 3 ML: .5; 3 SOLUTION RESPIRATORY (INHALATION) at 12:02

## 2017-02-11 RX ADMIN — IPRATROPIUM BROMIDE AND ALBUTEROL SULFATE 3 ML: .5; 3 SOLUTION RESPIRATORY (INHALATION) at 01:02

## 2017-02-11 RX ADMIN — METHYLPREDNISOLONE SODIUM SUCCINATE 80 MG: 125 INJECTION, POWDER, FOR SOLUTION INTRAMUSCULAR; INTRAVENOUS at 05:02

## 2017-02-11 RX ADMIN — PREDNISONE 50 MG: 5 TABLET ORAL at 11:02

## 2017-02-11 RX ADMIN — INSULIN ASPART 3 UNITS: 100 INJECTION, SOLUTION INTRAVENOUS; SUBCUTANEOUS at 04:02

## 2017-02-11 RX ADMIN — PANTOPRAZOLE SODIUM 40 MG: 40 TABLET, DELAYED RELEASE ORAL at 08:02

## 2017-02-11 RX ADMIN — ZIPRASIDONE MESYLATE 10 MG: 20 INJECTION, POWDER, LYOPHILIZED, FOR SOLUTION INTRAMUSCULAR at 11:02

## 2017-02-11 RX ADMIN — IPRATROPIUM BROMIDE AND ALBUTEROL SULFATE 3 ML: .5; 3 SOLUTION RESPIRATORY (INHALATION) at 07:02

## 2017-02-11 RX ADMIN — CLOPIDOGREL BISULFATE 75 MG: 75 TABLET ORAL at 08:02

## 2017-02-11 RX ADMIN — RIVASTIGMINE TRANSDERMAL SYSTEM 1 PATCH: 9.5 PATCH, EXTENDED RELEASE TRANSDERMAL at 08:02

## 2017-02-11 RX ADMIN — ATORVASTATIN CALCIUM 40 MG: 40 TABLET, FILM COATED ORAL at 08:02

## 2017-02-11 RX ADMIN — SODIUM PHOSPHATE, DIBASIC AND SODIUM PHOSPHATE, MONOBASIC 1 ENEMA: 7; 19 ENEMA RECTAL at 11:02

## 2017-02-11 RX ADMIN — SOTALOL HYDROCHLORIDE 120 MG: 80 TABLET ORAL at 08:02

## 2017-02-11 RX ADMIN — ISOSORBIDE MONONITRATE 120 MG: 60 TABLET, EXTENDED RELEASE ORAL at 08:02

## 2017-02-11 RX ADMIN — INSULIN ASPART 2 UNITS: 100 INJECTION, SOLUTION INTRAVENOUS; SUBCUTANEOUS at 05:02

## 2017-02-11 NOTE — PT/OT/SLP PROGRESS
Physical Therapy  Treatment    Thierry Fu   MRN: [id number]   Admitting Diagnosis: Mild intermittent asthma with acute exacerbation    PT Received On: 17  PT Start Time: 920     PT Stop Time: 935    PT Total Time (min): 15 min       Billable Minutes:  Gait [id number] and Therapeutic Activity 5min    Treatment Type: Treatment  PT/PTA: PTA     PTA Visit Number: 3       General Precautions: Standard, fall  Orthopedic Precautions: N/A   Braces:           Subjective:  Communicated with nurse Mendosa prior to session.  Reports feeling scared and not knowing where he is. Assured patient of safety measures in place.    Pain Ratin/10                   Objective:   Patient found with: oxygen, telemetry    Functional Mobility:  Bed Mobility:   Rolling/Turning Right: Minimum assistance  Scooting/Bridging: Minimum Assistance  Supine to Sit: Moderate Assistance    Transfers:  Sit <> Stand Assistance: Moderate Assistance  Sit <> Stand Assistive Device: Rolling Walker    Gait:   Gait Distance: 30' x 2 with chair follow and O2 @ 10L venti mask.  Assistance 1: Moderate assistance  Gait Assistive Device: Rolling walker  Gait Pattern: 3-point gait  Gait Deviation(s): decreased gabriela, decreased velocity of limb motion, decreased step length, decreased weight-shifting ability, decreased toe-to-floor clearance    Stairs:      Balance:   Static Sit: FAIR: Maintains without assist, but unable to take any challenges   Dynamic Sit: FAIR+: Maintains balance through MINIMAL excursions of active trunk motion  Static Stand: FAIR: Maintains without assist but unable to take challenges  Dynamic stand: POOR: N/A     Therapeutic Activities and Exercises:  Transferred to sitting EOB with extra time required. Ambulated 30' x 2 with rw and Mod A ( Max A during turns due to unsteadiness).  O2 attached @ 10 L venti mask  with chair follow.  Returned to sit on BSC with nurse and caregiver present. Requires increased verbal cues for  activity as LE's wobbly on occasion and assistance for rw needed.    AM-PAC 6 CLICK MOBILITY  How much help from another person does this patient currently need?   1 = Unable, Total/Dependent Assistance  2 = A lot, Maximum/Moderate Assistance  3 = A little, Minimum/Contact Guard/Supervision  4 = None, Modified [place]/Independent         AM-PAC Raw Score CMS G-Code Modifier Level of Impairment Assistance   6 % Total / Unable   7 - 9 CM 80 - 100% Maximal Assist   10 - 14 CL 60 - 80% Moderate Assist   15 - 19 CK 40 - 60% Moderate Assist   20 - 22 CJ 20 - 40% Minimal Assist   23 CI 1-20% SBA / CGA   24 CH 0% Independent/ Mod I     Patient left seated BSC with all lines intact, call button in reach and nurse and caregiver present.    Assessment:  Thierry Fu is a 87 y.o. male with a medical diagnosis of Mild intermittent asthma with acute exacerbation and presents with decreased cognition, limited endurance, limited mobiloty.    Rehab identified problem list/impairments: Rehab identified problem list/impairments: weakness, impaired endurance, impaired cognition, impaired functional mobilty, gait instability, impaired cardiopulmonary response to activity, decreased safety awareness    Rehab potential is fair.    Activity tolerance: Fair    Discharge recommendations: Discharge Facility/Level Of Care Needs: nursing facility, skilled     Barriers to discharge:      Equipment recommendations: Equipment Needed After Discharge: none     GOALS:   Physical Therapy Goals        Problem: Physical Therapy Goal    Goal Priority Disciplines Outcome Goal Variances Interventions   Physical Therapy Goal    High PT/OT, PT Ongoing (interventions implemented as appropriate)     Description:  Goals to be met by: 2017     Patient will increase functional independence with mobility by performin. Supine to sit with MInimal Assistance  2. Sit to stand transfer with Minimal Assistance  3. Bed to chair transfer  with Minimal Assistance using Rolling Walker  4. Gait  x 50 feet with Minimal Assistance using Rolling Walker.   5. Lower extremity exercise program x10 reps per handout, with assistance as needed                PLAN:    Patient to be seen BID  to address the above listed problems via gait training, therapeutic activities, therapeutic exercises  Plan of Care expires: 02/16/17  Plan of Care reviewed with: patient, caregiver         Selin Badillo, ARIELLE  02/11/2017

## 2017-02-11 NOTE — PLAN OF CARE
Problem: Physical Therapy Goal  Goal: Physical Therapy Goal  Goals to be met by: 2017     Patient will increase functional independence with mobility by performin. Supine to sit with MInimal Assistance  2. Sit to stand transfer with Minimal Assistance  3. Bed to chair transfer with Minimal Assistance using Rolling Walker  4. Gait x 50 feet with Minimal Assistance using Rolling Walker.   5. Lower extremity exercise program x10 reps per handout, with assistance as needed   Outcome: Ongoing (interventions implemented as appropriate)  Ambulated 30' x 2 with rw and Mod / Max A with O2 attached at 10 L  venti mask , chair follow and nurse Deondre present.

## 2017-02-11 NOTE — PROGRESS NOTES
[facility] Medicine  Progress Note    Patient Name: Thierry Fu  MRN: [id number]  Patient Class: IP- Inpatient   Admission Date: 2/7/2017  Length of Stay: 4 days  Attending Physician: Meet Berkowitz MD  Primary Care Provider: Rosendo Barrow Jr, MD        Subjective:     Principal Problem:Mild intermittent asthma with acute exacerbation    HPI:  Mr. Fu is an 86 yo male  admitted to the services of hospital medicine via the ER for asthma exacerbation. Unable to obtain history and ROS from pt due to dementia. History obtained from ER physician, family and previous epic records. Presents with CC of non-productive cough x 10 days. Associated with wheezing, SOB which are exacerbated by lying down. Started bronchodilator for wheezing and corticosteroids 3 days ago. No improvement noted. Remote history of smoking, quit 60 years ago. History of CAD and PPM. Diabetes type II controlled on oral medications.  Other past medical history as listed below.     Hospital Course:  Pt given supplemental O2, steroids and nebs. Cardiology evaluated patient for pacemake interrogation.     Interval History: Patient seen and examined.  Some agitation overnight but otherwise nothing acute.      Review of Systems   Constitutional: Negative for activity change, appetite change and fever.   HENT: Negative.    Eyes: Negative.    Respiratory: Positive for wheezing. Negative for chest tightness and shortness of breath.    Cardiovascular: Negative for chest pain, palpitations and leg swelling.   Gastrointestinal: Negative for abdominal distention, abdominal pain, blood in stool, diarrhea and vomiting.   Genitourinary: Negative for dysuria and hematuria.   Neurological: Negative for headaches.   Hematological: Negative for adenopathy.   Psychiatric/Behavioral: Positive for agitation. Negative for confusion.     Objective:     Vital Signs (Most Recent):  Temp: 98.2 °F (36.8 °C) (02/11/17 1040)  Pulse: 65  (02/11/17 1040)  Resp: 18 (02/11/17 1040)  BP: (!) 178/70 (02/11/17 1040)  SpO2: 96 % (02/11/17 1040) Vital Signs (24h Range):  Temp:  [98 °F (36.7 °C)-98.6 °F (37 °C)] 98.2 °F (36.8 °C)  Pulse:  [61-84] 65  Resp:  [14-20] 18  SpO2:  [88 %-97 %] 96 %  BP: (129-183)/(59-76) 178/70     Weight: 95.3 kg (210 lb)  Body mass index is 28.48 kg/(m^2).    Intake/Output Summary (Last 24 hours) at 02/11/17 1101  Last data filed at 02/11/17 0400   Gross per 24 hour   Intake              370 ml   Output              450 ml   Net              -80 ml      Physical Exam   Constitutional: He is oriented to person, place, and time. He appears well-developed and well-nourished. No distress.   HENT:   Head: Normocephalic and atraumatic.   Eyes: Pupils are equal, round, and reactive to light.   Neck: Neck supple. No thyromegaly present.   Cardiovascular: Normal rate and regular rhythm.  Exam reveals no gallop and no friction rub.    No murmur heard.  Pulmonary/Chest: Effort normal. No respiratory distress. He has wheezes.   Abdominal: Soft. Bowel sounds are normal. He exhibits no distension. There is no tenderness. There is no guarding.   Musculoskeletal: Normal range of motion. He exhibits no edema.   Neurological: He is alert and oriented to person, place, and time.   Skin: Skin is warm and dry. No erythema.   Psychiatric: He has a normal mood and affect.       Significant Labs: CBC: No results for input(s): WBC, HGB, HCT, PLT in the last 48 hours.    Significant Imaging: I have reviewed all pertinent imaging results/findings within the past 24 hours.    Assessment/Plan:      * Mild intermittent asthma with acute exacerbation  - improved  - decreased steroids to 50 PO daily  - continue Duonebs          Chronic kidney disease (CKD), stage III (moderate)  avoid nephrotoxins,  renal dose medications as needed  creatinine at baseline         Slow transit constipation  - fleets enema x1.  - daily miralax      VTE Risk Mitigation          Ordered     Medium Risk of VTE  Once      02/07/17 215          Meet Berkowitz MD  Department of Hospital Medicine   [facility]

## 2017-02-11 NOTE — PLAN OF CARE
02/11/17 0721   Patient Assessment/Suction   All Lung Fields Breath Sounds coarse   Cough Type good;nonproductive   PRE-TX-O2-ETCO2   O2 Device (Oxygen Therapy) venti mask   Flow (L/min) 12   Oxygen Concentration (%) 50   SpO2 96 %   Pulse Oximetry Type Intermittent   $ Pulse Oximetry - Multiple Charge Pulse Oximetry - Multiple   Pulse 67   Resp 18   Aerosol Therapy   $ Aerosol Therapy Charges Aerosol Treatment   Respiratory Treatment Status given   SVN/Inhaler Treatment Route mask;with oxygen   Position During Treatment HOB at 30 degrees   Patient Tolerance good   Post-Treatment   Post-treatment Heart Rate (beats/min) 67   Post-treatment Resp Rate (breaths/min) 18   All Fields Breath Sounds aeration increased;coarse       Aerosol treatments q6 hours. Patient tolerated well.

## 2017-02-11 NOTE — SUBJECTIVE & OBJECTIVE
Interval History: Patient seen and examined.  Some agitation overnight but otherwise nothing acute.      Review of Systems   Constitutional: Negative for activity change, appetite change and fever.   HENT: Negative.    Eyes: Negative.    Respiratory: Positive for wheezing. Negative for chest tightness and shortness of breath.    Cardiovascular: Negative for chest pain, palpitations and leg swelling.   Gastrointestinal: Negative for abdominal distention, abdominal pain, blood in stool, diarrhea and vomiting.   Genitourinary: Negative for dysuria and hematuria.   Neurological: Negative for headaches.   Hematological: Negative for adenopathy.   Psychiatric/Behavioral: Positive for agitation. Negative for confusion.     Objective:     Vital Signs (Most Recent):  Temp: 98.2 °F (36.8 °C) (02/11/17 1040)  Pulse: 65 (02/11/17 1040)  Resp: 18 (02/11/17 1040)  BP: (!) 178/70 (02/11/17 1040)  SpO2: 96 % (02/11/17 1040) Vital Signs (24h Range):  Temp:  [98 °F (36.7 °C)-98.6 °F (37 °C)] 98.2 °F (36.8 °C)  Pulse:  [61-84] 65  Resp:  [14-20] 18  SpO2:  [88 %-97 %] 96 %  BP: (129-183)/(59-76) 178/70     Weight: 95.3 kg (210 lb)  Body mass index is 28.48 kg/(m^2).    Intake/Output Summary (Last 24 hours) at 02/11/17 1101  Last data filed at 02/11/17 0400   Gross per 24 hour   Intake              370 ml   Output              450 ml   Net              -80 ml      Physical Exam   Constitutional: He is oriented to person, place, and time. He appears well-developed and well-nourished. No distress.   HENT:   Head: Normocephalic and atraumatic.   Eyes: Pupils are equal, round, and reactive to light.   Neck: Neck supple. No thyromegaly present.   Cardiovascular: Normal rate and regular rhythm.  Exam reveals no gallop and no friction rub.    No murmur heard.  Pulmonary/Chest: Effort normal. No respiratory distress. He has wheezes.   Abdominal: Soft. Bowel sounds are normal. He exhibits no distension. There is no tenderness. There is no  guarding.   Musculoskeletal: Normal range of motion. He exhibits no edema.   Neurological: He is alert and oriented to person, place, and time.   Skin: Skin is warm and dry. No erythema.   Psychiatric: He has a normal mood and affect.       Significant Labs: CBC: No results for input(s): WBC, HGB, HCT, PLT in the last 48 hours.    Significant Imaging: I have reviewed all pertinent imaging results/findings within the past 24 hours.

## 2017-02-11 NOTE — PROGRESS NOTES
02/10/17 2018   Patient Assessment/Suction   Level of Consciousness (AVPU) responds to voice   Respiratory Effort Mouth breathing   All Lung Fields Breath Sounds diminished   PRE-TX-O2-ETCO2   O2 Device (Oxygen Therapy) venti mask   $ Is the patient on Oxygen? Yes   Flow (L/min) 12   Oxygen Concentration (%) 50   SpO2 (!) 93 %   Pulse Oximetry Type Intermittent   Pulse 61   Resp 20   Aerosol Therapy   $ Aerosol Therapy Charges Aerosol Treatment   Respiratory Treatment Status given   SVN/Inhaler Treatment Route with oxygen;mask   Position During Treatment HOB at 30-45 degrees   Patient Tolerance good   Post-Treatment   Post-treatment Heart Rate (beats/min) 62   Post-treatment Resp Rate (breaths/min) 20   All Fields Breath Sounds unchanged   Ready to Wean/Extubation Screen   FIO2<60 (chart decimal) 0.5

## 2017-02-11 NOTE — PLAN OF CARE
Problem: Patient Care Overview  Goal: Plan of Care Review  Outcome: Ongoing (interventions implemented as appropriate)  Pt oriented to self only.  Constantly asking to get up and get out of here.  Sitter at bedside. Reoriented as needed, calming techniques used.  Haldol x 1 with minimal response noted.  States he's ready for breakfast.  IV pulled out to right wrist with catheter intact.  Restarted to left ac x 1 stick.  Pt tolerated well.  Bed alarm on.  Remains on 12L ventimask.  No dyspnea noted.  Restraints not used due to pt becoming more agitated per previous shift.

## 2017-02-11 NOTE — PLAN OF CARE
"Problem: Patient Care Overview  Goal: Plan of Care Review  Outcome: Ongoing (interventions implemented as appropriate)  Remains confused throughout the day but able to redirect. Wants to "go home".   Participated in therapy walked in the hallway and sit in bedside commode with assistance, free from injuries/falls.Tolerated well.   Vs stable, remains in paced rhythmn.  On DNR status  On Venti mask 10L Spo2 93%  Bed alarm activated while in bed- tries to get up but too weak to stand without assistance   Bedside rail up, call light in reach. Family at bedside  Will continue to monitor     "

## 2017-02-12 PROBLEM — K59.01 SLOW TRANSIT CONSTIPATION: Status: ACTIVE | Noted: 2017-02-12

## 2017-02-12 LAB
POCT GLUCOSE: 243 MG/DL (ref 70–110)
POCT GLUCOSE: 249 MG/DL (ref 70–110)
POCT GLUCOSE: 270 MG/DL (ref 70–110)
POCT GLUCOSE: 349 MG/DL (ref 70–110)
TB INDURATION 48 - 72 HR READ: 0 MM

## 2017-02-12 PROCEDURE — 27000221 HC OXYGEN, UP TO 24 HOURS

## 2017-02-12 PROCEDURE — 63600175 PHARM REV CODE 636 W HCPCS: Performed by: INTERNAL MEDICINE

## 2017-02-12 PROCEDURE — 94640 AIRWAY INHALATION TREATMENT: CPT

## 2017-02-12 PROCEDURE — 25000003 PHARM REV CODE 250: Performed by: INTERNAL MEDICINE

## 2017-02-12 PROCEDURE — 25000003 PHARM REV CODE 250: Performed by: EMERGENCY MEDICINE

## 2017-02-12 PROCEDURE — 11000001 HC ACUTE MED/SURG PRIVATE ROOM

## 2017-02-12 PROCEDURE — 97116 GAIT TRAINING THERAPY: CPT

## 2017-02-12 PROCEDURE — 25000003 PHARM REV CODE 250: Performed by: NURSE PRACTITIONER

## 2017-02-12 PROCEDURE — 94761 N-INVAS EAR/PLS OXIMETRY MLT: CPT

## 2017-02-12 PROCEDURE — 63600175 PHARM REV CODE 636 W HCPCS: Performed by: NURSE PRACTITIONER

## 2017-02-12 PROCEDURE — 25000242 PHARM REV CODE 250 ALT 637 W/ HCPCS: Performed by: EMERGENCY MEDICINE

## 2017-02-12 PROCEDURE — 63600175 PHARM REV CODE 636 W HCPCS: Performed by: HOSPITALIST

## 2017-02-12 RX ORDER — AMLODIPINE BESYLATE 5 MG/1
5 TABLET ORAL DAILY
Status: DISCONTINUED | OUTPATIENT
Start: 2017-02-12 | End: 2017-02-13

## 2017-02-12 RX ADMIN — CLOPIDOGREL BISULFATE 75 MG: 75 TABLET ORAL at 09:02

## 2017-02-12 RX ADMIN — SOTALOL HYDROCHLORIDE 120 MG: 80 TABLET ORAL at 09:02

## 2017-02-12 RX ADMIN — PANTOPRAZOLE SODIUM 40 MG: 40 TABLET, DELAYED RELEASE ORAL at 09:02

## 2017-02-12 RX ADMIN — ZIPRASIDONE MESYLATE 10 MG: 20 INJECTION, POWDER, LYOPHILIZED, FOR SOLUTION INTRAMUSCULAR at 12:02

## 2017-02-12 RX ADMIN — IPRATROPIUM BROMIDE AND ALBUTEROL SULFATE 3 ML: .5; 3 SOLUTION RESPIRATORY (INHALATION) at 07:02

## 2017-02-12 RX ADMIN — PREDNISONE 50 MG: 5 TABLET ORAL at 09:02

## 2017-02-12 RX ADMIN — QUETIAPINE FUMARATE 50 MG: 25 TABLET, FILM COATED ORAL at 09:02

## 2017-02-12 RX ADMIN — ATORVASTATIN CALCIUM 40 MG: 40 TABLET, FILM COATED ORAL at 09:02

## 2017-02-12 RX ADMIN — DULOXETINE 60 MG: 30 CAPSULE, DELAYED RELEASE ORAL at 09:02

## 2017-02-12 RX ADMIN — INSULIN ASPART 2 UNITS: 100 INJECTION, SOLUTION INTRAVENOUS; SUBCUTANEOUS at 06:02

## 2017-02-12 RX ADMIN — INSULIN ASPART 2 UNITS: 100 INJECTION, SOLUTION INTRAVENOUS; SUBCUTANEOUS at 08:02

## 2017-02-12 RX ADMIN — INSULIN DETEMIR 15 UNITS: 100 INJECTION, SOLUTION SUBCUTANEOUS at 09:02

## 2017-02-12 RX ADMIN — VALSARTAN 160 MG: 80 TABLET ORAL at 09:02

## 2017-02-12 RX ADMIN — INSULIN ASPART 3 UNITS: 100 INJECTION, SOLUTION INTRAVENOUS; SUBCUTANEOUS at 04:02

## 2017-02-12 RX ADMIN — IPRATROPIUM BROMIDE AND ALBUTEROL SULFATE 3 ML: .5; 3 SOLUTION RESPIRATORY (INHALATION) at 12:02

## 2017-02-12 RX ADMIN — QUETIAPINE FUMARATE 50 MG: 25 TABLET, FILM COATED ORAL at 08:02

## 2017-02-12 RX ADMIN — INSULIN ASPART 2 UNITS: 100 INJECTION, SOLUTION INTRAVENOUS; SUBCUTANEOUS at 11:02

## 2017-02-12 RX ADMIN — AMLODIPINE BESYLATE 5 MG: 5 TABLET ORAL at 12:02

## 2017-02-12 RX ADMIN — HALOPERIDOL LACTATE 5 MG: 5 INJECTION, SOLUTION INTRAMUSCULAR at 09:02

## 2017-02-12 RX ADMIN — RIVASTIGMINE TRANSDERMAL SYSTEM 1 PATCH: 9.5 PATCH, EXTENDED RELEASE TRANSDERMAL at 09:02

## 2017-02-12 RX ADMIN — POLYETHYLENE GLYCOL (3350) 17 G: 17 POWDER, FOR SOLUTION ORAL at 09:02

## 2017-02-12 RX ADMIN — HALOPERIDOL LACTATE 5 MG: 5 INJECTION, SOLUTION INTRAMUSCULAR at 11:02

## 2017-02-12 RX ADMIN — ISOSORBIDE MONONITRATE 120 MG: 60 TABLET, EXTENDED RELEASE ORAL at 09:02

## 2017-02-12 NOTE — PLAN OF CARE
Problem: Physical Therapy Goal  Goal: Physical Therapy Goal  Goals to be met by: 2017     Patient will increase functional independence with mobility by performin. Supine to sit with MInimal Assistance  2. Sit to stand transfer with Minimal Assistance  3. Bed to chair transfer with Minimal Assistance using Rolling Walker  4. Gait x 50 feet with Minimal Assistance using Rolling Walker.   5. Lower extremity exercise program x10 reps per handout, with assistance as needed   Outcome: Ongoing (interventions implemented as appropriate)  Ambulated 60' with rw and Min/ Mod A with O2 attached and chair follow. Requires A for safety and continuous reorienting with activity.

## 2017-02-12 NOTE — PROGRESS NOTES
02/11/17 1923   Patient Assessment/Suction   Level of Consciousness (AVPU) responds to voice   All Lung Fields Breath Sounds diminished;coarse   PRE-TX-O2-ETCO2   O2 Device (Oxygen Therapy) venti mask   $ Is the patient on Oxygen? Yes   Flow (L/min) 10   Oxygen Concentration (%) 50   SpO2 (!) 94 %   Pulse Oximetry Type Intermittent   Pulse 61   Resp 18   Positioning HOB elevated 45 degrees   Aerosol Therapy   $ Aerosol Therapy Charges Aerosol Treatment   Respiratory Treatment Status given   SVN/Inhaler Treatment Route mask;with oxygen   Position During Treatment HOB at 30-45 degrees   Patient Tolerance good   Post-Treatment   Post-treatment Heart Rate (beats/min) 61   Post-treatment Resp Rate (breaths/min) 18   All Fields Breath Sounds unchanged   Ready to Wean/Extubation Screen   FIO2<60 (chart decimal) 0.5

## 2017-02-12 NOTE — PLAN OF CARE
"Problem: Patient Care Overview  Goal: Plan of Care Review  Outcome: Ongoing (interventions implemented as appropriate)  Remains confused throughout the day but able to redirect. Wants to "go to bathroom".   Participated in therapy walked in the hallway and sit in chair with assistance, free from injuries/falls.Tolerated well.   Vs stable, remains in paced rhythmn.  On DNR status  On 3L nasal cannula Spo2 93%  Bed alarm activated while in bed- tries to get up but too weak to stand without assistance   Bedside rail up, call light in reach. Sitter at bedside  Will continue to monitor     "

## 2017-02-12 NOTE — ASSESSMENT & PLAN NOTE
- improved  - decreased steroids to 50 PO daily  - continue Duonebs  - doing well and maintaining sats >92% on nasal cannula

## 2017-02-12 NOTE — PLAN OF CARE
Problem: Patient Care Overview  Goal: Plan of Care Review  Outcome: Ongoing (interventions implemented as appropriate)  Alert and confused. Sitter at bedside. Agitation noted at the beginning of shift. Seroquel 50 mg po given as routine order.   Agitation decreased and pt fell asleep. Awaken several time during the night for diaper change. Fall and injury free this shift.  Blood sugar checked with sliding scale coverage noted. Bed in low position and locked. Call bell in reach. Side rails up x 2.  Telemetry - Paced

## 2017-02-12 NOTE — PT/OT/SLP PROGRESS
"Physical Therapy  Treatment    Thierry Fu   MRN: [id number]   Admitting Diagnosis: Mild intermittent asthma with acute exacerbation    PT Received On: 17  PT Start Time: 1000     PT Stop Time: 1010    PT Total Time (min): 10 min       Billable Minutes:  Gait [id number]    Treatment Type: Treatment  PT/PTA: PTA     PTA Visit Number: 4       General Precautions: Standard, fall  Orthopedic Precautions: N/A   Braces:           Subjective:  Communicated with nurse Deondre ( present) prior to session.  Agreed to ambulate, stating " Please, I need to get out ". Caregiver at bedside.    Pain Ratin/10                   Objective:   Patient found with: oxygen, telemetry    Functional Mobility:  Bed Mobility:   Rolling/Turning Right: Minimum assistance  Scooting/Bridging: Minimum Assistance  Supine to Sit: Moderate Assistance    Transfers:  Sit <> Stand Assistance: Moderate Assistance  Sit <> Stand Assistive Device: Rolling Walker    Gait:   Gait Distance: 60' with O2 attached @ 3L NC.  Assistance 1: Moderate assistance  Gait Assistive Device: Rolling walker  Gait Pattern: 3-point gait  Gait Deviation(s): decreased gabriela, decreased velocity of limb motion, decreased weight-shifting ability, decreased toe-to-floor clearance, decreased step length    Stairs:      Balance:   Static Sit: FAIR: Maintains without assist, but unable to take any challenges   Dynamic Sit: FAIR+: Maintains balance through MINIMAL excursions of active trunk motion  Static Stand: FAIR: Maintains without assist but unable to take challenges  Dynamic stand: POOR: N/A     Therapeutic Activities and Exercises:  Required verbal stimulation to keep eyes opened and participate with increased instruction for safety and orientation to surroundings.  Ambulated 60' with O 2@ 3l NC and Min / Mod A with chair follow. Continuously stating " I got to get out".  Returned to room in chair and LE's elevated. Assisted nurse with giving patient medicine " as now more cooperative and following directions better.     AM-PAC 6 CLICK MOBILITY  How much help from another person does this patient currently need?   1 = Unable, Total/Dependent Assistance  2 = A lot, Maximum/Moderate Assistance  3 = A little, Minimum/Contact Guard/Supervision  4 = None, Modified [place]/Independent         AM-PAC Raw Score CMS G-Code Modifier Level of Impairment Assistance   6 % Total / Unable   7 - 9 CM 80 - 100% Maximal Assist   10 - 14 CL 60 - 80% Moderate Assist   15 - 19 CK 40 - 60% Moderate Assist   20 - 22 CJ 20 - 40% Minimal Assist   23 CI 1-20% SBA / CGA   24 CH 0% Independent/ Mod I     Patient left up in chair with all lines intact, call button in reach and nurse Deondre and caregiver present.    Assessment:  Thierry Fu is a 87 y.o. male with a medical diagnosis of Mild intermittent asthma with acute exacerbation and presents with decreased cognition, limited endurance, decreased safety awareness..    Rehab identified problem list/impairments: Rehab identified problem list/impairments: weakness, impaired endurance, impaired cognition, impaired functional mobilty, gait instability, impaired cardiopulmonary response to activity, decreased safety awareness    Rehab potential is poor.    Activity tolerance: Fair    Discharge recommendations: Discharge Facility/Level Of Care Needs: nursing facility, skilled     Barriers to discharge:      Equipment recommendations: Equipment Needed After Discharge: none     GOALS:   Physical Therapy Goals        Problem: Physical Therapy Goal    Goal Priority Disciplines Outcome Goal Variances Interventions   Physical Therapy Goal    High PT/OT, PT Ongoing (interventions implemented as appropriate)     Description:  Goals to be met by: 2017     Patient will increase functional independence with mobility by performin. Supine to sit with MInimal Assistance  2. Sit to stand transfer with Minimal Assistance  3. Bed to  chair transfer with Minimal Assistance using Rolling Walker  4. Gait  x 50 feet with Minimal Assistance using Rolling Walker.   5. Lower extremity exercise program x10 reps per handout, with assistance as needed                PLAN:    Patient to be seen BID  to address the above listed problems via gait training, therapeutic activities, therapeutic exercises  Plan of Care expires: 02/16/17  Plan of Care reviewed with: patient, caregiver         Selin Badillo, ARIELLE  02/12/2017

## 2017-02-12 NOTE — PROGRESS NOTES
[facility] Hospital Medicine  Progress Note    Patient Name: Thierry Fu  MRN: [id number]  Patient Class: IP- Inpatient   Admission Date: 2/7/2017  Length of Stay: 5 days  Attending Physician: Meet Berkowitz MD  Primary Care Provider: Rosendo Barrow Jr, MD        Subjective:     Principal Problem:Mild intermittent asthma with acute exacerbation    HPI:  Mr. Fu is an 86 yo male  admitted to the services of hospital medicine via the ER for asthma exacerbation. Unable to obtain history and ROS from pt due to dementia. History obtained from ER physician, family and previous epic records. Presents with CC of non-productive cough x 10 days. Associated with wheezing, SOB which are exacerbated by lying down. Started bronchodilator for wheezing and corticosteroids 3 days ago. No improvement noted. Remote history of smoking, quit 60 years ago. History of CAD and PPM. Diabetes type II controlled on oral medications.  Other past medical history as listed below.     Hospital Course:  Pt given supplemental O2, steroids and nebs. Cardiology evaluated patient for pacemake interrogation.     Interval History: Patient seen and examined.  No acute events overnight.  He rested much better last night.    Review of Systems   Constitutional: Negative for activity change, appetite change and fever.   HENT: Negative.    Eyes: Negative.    Respiratory: Positive for wheezing. Negative for chest tightness and shortness of breath.    Cardiovascular: Negative for chest pain, palpitations and leg swelling.   Gastrointestinal: Negative for abdominal distention, abdominal pain, blood in stool, diarrhea and vomiting.   Genitourinary: Negative for dysuria and hematuria.   Neurological: Negative for headaches.   Hematological: Negative for adenopathy.   Psychiatric/Behavioral: Negative for confusion.     Objective:     Vital Signs (Most Recent):  Temp: 97.9 °F (36.6 °C) (02/12/17 0500)  Pulse: 80 (02/12/17  0842)  Resp: 18 (02/12/17 0700)  BP: (!) 160/64 (02/12/17 0500)  SpO2: (!) 94 % (02/12/17 0700) Vital Signs (24h Range):  Temp:  [97.9 °F (36.6 °C)-98.2 °F (36.8 °C)] 97.9 °F (36.6 °C)  Pulse:  [60-82] 80  Resp:  [16-20] 18  SpO2:  [93 %-98 %] 94 %  BP: (150-178)/(57-75) 160/64     Weight: 95.3 kg (210 lb)  Body mass index is 28.48 kg/(m^2).    Intake/Output Summary (Last 24 hours) at 02/12/17 0849  Last data filed at 02/11/17 1500   Gross per 24 hour   Intake              400 ml   Output              200 ml   Net              200 ml      Physical Exam   Constitutional: He is oriented to person, place, and time. He appears well-developed and well-nourished. No distress.   HENT:   Head: Normocephalic and atraumatic.   Eyes: Pupils are equal, round, and reactive to light.   Neck: Neck supple. No thyromegaly present.   Cardiovascular: Normal rate and regular rhythm.  Exam reveals no gallop and no friction rub.    No murmur heard.  Pulmonary/Chest: Effort normal. No respiratory distress. He has wheezes.   Abdominal: Soft. Bowel sounds are normal. He exhibits no distension. There is no tenderness. There is no guarding.   Musculoskeletal: Normal range of motion. He exhibits no edema.   Neurological: He is alert and oriented to person, place, and time.   Skin: Skin is warm and dry. No erythema.   Psychiatric: He has a normal mood and affect.       Significant Labs: CMP: No results for input(s): NA, K, CL, CO2, GLU, BUN, CREATININE, CALCIUM, PROT, ALBUMIN, BILITOT, ALKPHOS, AST, ALT, ANIONGAP, EGFRNONAA in the last 48 hours.    Invalid input(s): [place]    Significant Imaging: I have reviewed and interpreted all pertinent imaging results/findings within the past 24 hours.    Assessment/Plan:      * Mild intermittent asthma with acute exacerbation  - improved  - decreased steroids to 50 PO daily  - continue Duonebs  - doing well and maintaining sats >92% on nasal cannula          Slow transit constipation  -  resolved    VTE Risk Mitigation         Ordered     Medium Risk of VTE  Once      02/07/17 9784          Meet Berkowitz MD  Department of Hospital Medicine   [facility]

## 2017-02-12 NOTE — SUBJECTIVE & OBJECTIVE
Interval History: Patient seen and examined.  No acute events overnight.  He rested much better last night.    Review of Systems   Constitutional: Negative for activity change, appetite change and fever.   HENT: Negative.    Eyes: Negative.    Respiratory: Positive for wheezing. Negative for chest tightness and shortness of breath.    Cardiovascular: Negative for chest pain, palpitations and leg swelling.   Gastrointestinal: Negative for abdominal distention, abdominal pain, blood in stool, diarrhea and vomiting.   Genitourinary: Negative for dysuria and hematuria.   Neurological: Negative for headaches.   Hematological: Negative for adenopathy.   Psychiatric/Behavioral: Negative for confusion.     Objective:     Vital Signs (Most Recent):  Temp: 97.9 °F (36.6 °C) (02/12/17 0500)  Pulse: 80 (02/12/17 0842)  Resp: 18 (02/12/17 0700)  BP: (!) 160/64 (02/12/17 0500)  SpO2: (!) 94 % (02/12/17 0700) Vital Signs (24h Range):  Temp:  [97.9 °F (36.6 °C)-98.2 °F (36.8 °C)] 97.9 °F (36.6 °C)  Pulse:  [60-82] 80  Resp:  [16-20] 18  SpO2:  [93 %-98 %] 94 %  BP: (150-178)/(57-75) 160/64     Weight: 95.3 kg (210 lb)  Body mass index is 28.48 kg/(m^2).    Intake/Output Summary (Last 24 hours) at 02/12/17 0849  Last data filed at 02/11/17 1500   Gross per 24 hour   Intake              400 ml   Output              200 ml   Net              200 ml      Physical Exam   Constitutional: He is oriented to person, place, and time. He appears well-developed and well-nourished. No distress.   HENT:   Head: Normocephalic and atraumatic.   Eyes: Pupils are equal, round, and reactive to light.   Neck: Neck supple. No thyromegaly present.   Cardiovascular: Normal rate and regular rhythm.  Exam reveals no gallop and no friction rub.    No murmur heard.  Pulmonary/Chest: Effort normal. No respiratory distress. He has wheezes.   Abdominal: Soft. Bowel sounds are normal. He exhibits no distension. There is no tenderness. There is no guarding.    Musculoskeletal: Normal range of motion. He exhibits no edema.   Neurological: He is alert and oriented to person, place, and time.   Skin: Skin is warm and dry. No erythema.   Psychiatric: He has a normal mood and affect.       Significant Labs: CMP: No results for input(s): NA, K, CL, CO2, GLU, BUN, CREATININE, CALCIUM, PROT, ALBUMIN, BILITOT, ALKPHOS, AST, ALT, ANIONGAP, EGFRNONAA in the last 48 hours.    Invalid input(s): [place]    Significant Imaging: I have reviewed and interpreted all pertinent imaging results/findings within the past 24 hours.

## 2017-02-12 NOTE — PLAN OF CARE
02/12/17 0700   Patient Assessment/Suction   All Lung Fields Breath Sounds diminished;crackles fine   PRE-TX-O2-ETCO2   O2 Device (Oxygen Therapy) nasal cannula   Flow (L/min) 3   Oxygen Concentration (%) 32   SpO2 (!) 94 %   Pulse Oximetry Type Intermittent   $ Pulse Oximetry - Multiple Charge Pulse Oximetry - Multiple   Pulse 77   Resp 18   Aerosol Therapy   $ Aerosol Therapy Charges Aerosol Treatment   Respiratory Treatment Status given   SVN/Inhaler Treatment Route mask;with oxygen   Position During Treatment HOB at 30 degrees   Patient Tolerance good   Post-Treatment   Post-treatment Heart Rate (beats/min) 77   Post-treatment Resp Rate (breaths/min) 20   All Fields Breath Sounds aeration increased     Aerosol treatments q6 hours. Patient tolerated well.

## 2017-02-13 PROBLEM — K59.01 SLOW TRANSIT CONSTIPATION: Status: RESOLVED | Noted: 2017-02-12 | Resolved: 2017-02-13

## 2017-02-13 LAB
POCT GLUCOSE: 238 MG/DL (ref 70–110)
POCT GLUCOSE: 293 MG/DL (ref 70–110)
POCT GLUCOSE: 366 MG/DL (ref 70–110)
POCT GLUCOSE: 370 MG/DL (ref 70–110)

## 2017-02-13 PROCEDURE — 97116 GAIT TRAINING THERAPY: CPT

## 2017-02-13 PROCEDURE — 97110 THERAPEUTIC EXERCISES: CPT

## 2017-02-13 PROCEDURE — 25000003 PHARM REV CODE 250: Performed by: EMERGENCY MEDICINE

## 2017-02-13 PROCEDURE — 11000001 HC ACUTE MED/SURG PRIVATE ROOM

## 2017-02-13 PROCEDURE — 94640 AIRWAY INHALATION TREATMENT: CPT

## 2017-02-13 PROCEDURE — 94761 N-INVAS EAR/PLS OXIMETRY MLT: CPT

## 2017-02-13 PROCEDURE — 25000003 PHARM REV CODE 250: Performed by: INTERNAL MEDICINE

## 2017-02-13 PROCEDURE — 25000242 PHARM REV CODE 250 ALT 637 W/ HCPCS: Performed by: EMERGENCY MEDICINE

## 2017-02-13 PROCEDURE — 97535 SELF CARE MNGMENT TRAINING: CPT

## 2017-02-13 PROCEDURE — 25000003 PHARM REV CODE 250: Performed by: NURSE PRACTITIONER

## 2017-02-13 PROCEDURE — 27000221 HC OXYGEN, UP TO 24 HOURS

## 2017-02-13 PROCEDURE — 63600175 PHARM REV CODE 636 W HCPCS: Performed by: HOSPITALIST

## 2017-02-13 PROCEDURE — 63600175 PHARM REV CODE 636 W HCPCS: Performed by: INTERNAL MEDICINE

## 2017-02-13 RX ORDER — ISOSORBIDE MONONITRATE 60 MG/1
60 TABLET, EXTENDED RELEASE ORAL DAILY
Status: DISCONTINUED | OUTPATIENT
Start: 2017-02-14 | End: 2017-02-16 | Stop reason: HOSPADM

## 2017-02-13 RX ORDER — INSULIN ASPART 100 [IU]/ML
4 INJECTION, SOLUTION INTRAVENOUS; SUBCUTANEOUS
Status: DISCONTINUED | OUTPATIENT
Start: 2017-02-13 | End: 2017-02-16 | Stop reason: HOSPADM

## 2017-02-13 RX ADMIN — IPRATROPIUM BROMIDE AND ALBUTEROL SULFATE 3 ML: .5; 3 SOLUTION RESPIRATORY (INHALATION) at 08:02

## 2017-02-13 RX ADMIN — INSULIN ASPART 5 UNITS: 100 INJECTION, SOLUTION INTRAVENOUS; SUBCUTANEOUS at 12:02

## 2017-02-13 RX ADMIN — INSULIN ASPART 4 UNITS: 100 INJECTION, SOLUTION INTRAVENOUS; SUBCUTANEOUS at 05:02

## 2017-02-13 RX ADMIN — PREDNISONE 25 MG: 5 TABLET ORAL at 09:02

## 2017-02-13 RX ADMIN — SOTALOL HYDROCHLORIDE 120 MG: 80 TABLET ORAL at 09:02

## 2017-02-13 RX ADMIN — IPRATROPIUM BROMIDE AND ALBUTEROL SULFATE 3 ML: .5; 3 SOLUTION RESPIRATORY (INHALATION) at 07:02

## 2017-02-13 RX ADMIN — ISOSORBIDE MONONITRATE 120 MG: 60 TABLET, EXTENDED RELEASE ORAL at 09:02

## 2017-02-13 RX ADMIN — INSULIN ASPART 1 UNITS: 100 INJECTION, SOLUTION INTRAVENOUS; SUBCUTANEOUS at 10:02

## 2017-02-13 RX ADMIN — INSULIN DETEMIR 15 UNITS: 100 INJECTION, SOLUTION SUBCUTANEOUS at 09:02

## 2017-02-13 RX ADMIN — CLOPIDOGREL BISULFATE 75 MG: 75 TABLET ORAL at 09:02

## 2017-02-13 RX ADMIN — VALSARTAN 160 MG: 80 TABLET ORAL at 09:02

## 2017-02-13 RX ADMIN — INSULIN ASPART 5 UNITS: 100 INJECTION, SOLUTION INTRAVENOUS; SUBCUTANEOUS at 05:02

## 2017-02-13 RX ADMIN — POLYETHYLENE GLYCOL (3350) 17 G: 17 POWDER, FOR SOLUTION ORAL at 09:02

## 2017-02-13 RX ADMIN — AMLODIPINE BESYLATE 5 MG: 5 TABLET ORAL at 09:02

## 2017-02-13 RX ADMIN — IPRATROPIUM BROMIDE AND ALBUTEROL SULFATE 3 ML: .5; 3 SOLUTION RESPIRATORY (INHALATION) at 01:02

## 2017-02-13 RX ADMIN — PANTOPRAZOLE SODIUM 40 MG: 40 TABLET, DELAYED RELEASE ORAL at 09:02

## 2017-02-13 RX ADMIN — INSULIN ASPART 4 UNITS: 100 INJECTION, SOLUTION INTRAVENOUS; SUBCUTANEOUS at 12:02

## 2017-02-13 RX ADMIN — QUETIAPINE FUMARATE 50 MG: 25 TABLET, FILM COATED ORAL at 10:02

## 2017-02-13 RX ADMIN — QUETIAPINE FUMARATE 50 MG: 25 TABLET, FILM COATED ORAL at 09:02

## 2017-02-13 RX ADMIN — RIVASTIGMINE TRANSDERMAL SYSTEM 1 PATCH: 9.5 PATCH, EXTENDED RELEASE TRANSDERMAL at 09:02

## 2017-02-13 RX ADMIN — DULOXETINE 60 MG: 30 CAPSULE, DELAYED RELEASE ORAL at 09:02

## 2017-02-13 RX ADMIN — ATORVASTATIN CALCIUM 40 MG: 40 TABLET, FILM COATED ORAL at 09:02

## 2017-02-13 RX ADMIN — INSULIN ASPART 3 UNITS: 100 INJECTION, SOLUTION INTRAVENOUS; SUBCUTANEOUS at 06:02

## 2017-02-13 NOTE — PLAN OF CARE
Problem: Patient Care Overview  Goal: Plan of Care Review  Outcome: Ongoing (interventions implemented as appropriate)  Lorena aerosol tx well BBS mild wheezes and congestion good cough NC @ 3L

## 2017-02-13 NOTE — PROGRESS NOTES
0490  Contacted Neeta Mckenzie to f/u on referral; left message for Karo in Admissions.    1000  Contacted Naida to f/u; Kaity stated that she was on Friday however she is reviewing at this time.    1008  Contacted Derik Griffiths to f/u; patient is medically accepted however checking financials.

## 2017-02-13 NOTE — PLAN OF CARE
Problem: Patient Care Overview  Goal: Plan of Care Review  Outcome: Ongoing (interventions implemented as appropriate)  Alert and pleasantly confused. Blood glucose monitored, insulin given per order. Denies pain. Ambulates with assist to bedside commode. Sitter at bedside. Bed in lowest position, wheels locked, call light within reach. Will continue to monitor.

## 2017-02-13 NOTE — PROGRESS NOTES
1152  Call received from Karo with [facility] stating that they are unable to accept the patient due to not having a long term bed available and they anticipate the patient requiring long term care.

## 2017-02-13 NOTE — PROGRESS NOTES
1505  Met with pt's daughter Rylie and Dr Barrow regarding d/c plan.  Patient has been denied by [facility] Smith and Neeta Mckenzie.  Decisions pending by [facility] and Naida.  Rylie stated that a nurse from [facility] is planning to come to the hospital later today to evaluate patient for possible admission.  Rylie stated that she prefers for patient to discharge straight to [facility] if they accept him in his current condition.  If patient is not accepted in his current condition to [facility] then the plan remains SNF.    1515  Call received from Jessi at [facility] stating that she is coming shortly to evaluate patient and meet with family.    1518  Informed Rylie that Jessi is coming to the hospital shortly.

## 2017-02-13 NOTE — PT/OT/SLP PROGRESS
Physical Therapy  Treatment    Thierry Fu   MRN: [id number]   Admitting Diagnosis: Mild intermittent asthma with acute exacerbation    PT Received On: 17  PT Start Time: 1330     PT Stop Time: 1343    PT Total Time (min): 13 min       Billable Minutes:  Gait [id number]    Treatment Type: Treatment  PT/PTA: PTA     PTA Visit Number: 4       General Precautions: Standard, fall  Orthopedic Precautions: N/A   Braces:           Subjective:  Communicated with nurse Mayi prior to session.  Agreed to ambulate.    Pain Ratin/10                   Objective:   Patient found with: oxygen, telemetry    Functional Mobility:  Bed Mobility:   Rolling/Turning Right: Contact guard assistance  Scooting/Bridging: Contact Guard Assistance  Supine to Sit: Minimum Assistance, With side rail  Sit to Supine: Moderate Assistance, With siderail    Transfers:  Sit <> Stand Assistance: Moderate Assistance  Sit <> Stand Assistive Device: Rolling Walker  Bed <> Chair Technique: Stand Pivot  Bed <> Chair Assistance: Moderate Assistance  Bed <> Chair Assistive Device: Rolling Walker    Gait:   Gait Distance: 30' with O2 attached and chair follow.  Assistance 1: Moderate assistance  Gait Assistive Device: Rolling walker  Gait Pattern: 3-point gait  Gait Deviation(s): decreased gabriela, decreased velocity of limb motion, decreased step length, decreased toe-to-floor clearance    Stairs:      Balance:   Static Sit: FAIR: Maintains without assist, but unable to take any challenges   Dynamic Sit: FAIR+: Maintains balance through MINIMAL excursions of active trunk motion  Static Stand: FAIR: Maintains without assist but unable to take challenges  Dynamic stand: POOR: N/A     Therapeutic Activities and Exercises:  Seated in chair at bedside. Ambulated 30' with rw and O2 attached @ 3L NC with chair follow. Increased verbal cues required to stand tall and increase step length. Several standing stops taken as patient appeared tired and  LE's wobbly. Taken to room in chair and transferred to bed via SPT with rw and Mod A.     AM-PAC 6 CLICK MOBILITY  How much help from another person does this patient currently need?   1 = Unable, Total/Dependent Assistance  2 = A lot, Maximum/Moderate Assistance  3 = A little, Minimum/Contact Guard/Supervision  4 = None, Modified [place]/Independent         AM-PAC Raw Score CMS G-Code Modifier Level of Impairment Assistance   6 % Total / Unable   7 - 9 CM 80 - 100% Maximal Assist   10 - 14 CL 60 - 80% Moderate Assist   15 - 19 CK 40 - 60% Moderate Assist   20 - 22 CJ 20 - 40% Minimal Assist   23 CI 1-20% SBA / CGA   24 CH 0% Independent/ Mod I     Patient left supine with all lines intact, call button in reach, nurse Mayi notified and caregiver present.    Assessment:  Thierry Fu is a 87 y.o. male with a medical diagnosis of Mild intermittent asthma with acute exacerbation and presents with weakness, limited endurance.    Rehab identified problem list/impairments: Rehab identified problem list/impairments: weakness, impaired endurance, impaired cardiopulmonary response to activity, impaired functional mobilty, impaired self care skills, decreased safety awareness, impaired cognition    Rehab potential is fair.    Activity tolerance: Fair    Discharge recommendations: Discharge Facility/Level Of Care Needs: nursing facility, skilled     Barriers to discharge:      Equipment recommendations: Equipment Needed After Discharge: none     GOALS:   Physical Therapy Goals        Problem: Physical Therapy Goal    Goal Priority Disciplines Outcome Goal Variances Interventions   Physical Therapy Goal    High PT/OT, PT Ongoing (interventions implemented as appropriate)     Description:  Goals to be met by: 2017     Patient will increase functional independence with mobility by performin. Supine to sit with MInimal Assistance  2. Sit to stand transfer with Minimal Assistance  3. Bed to chair  transfer with Minimal Assistance using Rolling Walker  4. Gait  x 50 feet with Minimal Assistance using Rolling Walker.   5. Lower extremity exercise program x10 reps per handout, with assistance as needed                PLAN:    Patient to be seen BID  to address the above listed problems via gait training, therapeutic activities, therapeutic exercises  Plan of Care expires: 02/16/17  Plan of Care reviewed with: patient, caregiver         Selin Badillo, ARIELLE  02/13/2017

## 2017-02-13 NOTE — PT/OT/SLP PROGRESS
Occupational Therapy  Treatment    Thierry Fu   MRN: [id number]   Admitting Diagnosis: Mild intermittent asthma with acute exacerbation    OT Date of Treatment: 17   OT Start Time: 1445  OT Stop Time: 1510  OT Total Time (min): 25 min    Billable Minutes:  Self Care/Home Management 25    General Precautions: Standard, fall        Subjective:  Communicated with nurse Moira prior to session.      Pain Ratin/10      Objective:  Patient found with: oxygen, telemetry     Functional Mobility:    Bed Mobility:  Supine to sit: min assistance  Sit to supine: mod assistance    Transfers:  Toilet transfer <> bedside commode: mod assistance x 2 with caregiver        Activities of Daily Living:    LBD with max assistance to don socks    Toileting with max assistance      Balance:   Static Sit: FAIR: Maintains without assist, but unable to take any challenges   Static Stand: FAIR: Maintains without assist but unable to take challenges           AM-PAC 6 CLICK ADL   How much help from another person does this patient currently need?   1 = Unable, Total/Dependent Assistance  2 = A lot, Maximum/Moderate Assistance  3 = A little, Minimum/Contact Guard/Supervision  4 = None, Modified [place]/Independent          AM-PAC Raw Score CMS G-Code Modifier Level of Impairment Assistance   6 % Total / Unable   7 - 9 CM 80 - 100% Maximal Assist   10 - 14 CL 60 - 80% Moderate Assist   15 - 19 CK 40 - 60% Moderate Assist   20 - 22 CJ 20 - 40% Minimal Assist   23 CI 1-20% SBA / CGA   24 CH 0% Independent/ Mod I     Patient left supine with call button in reach, bed alarm on and caregiver present    ASSESSMENT:  Patient presents with self care skills deficits and transfer skills deficits.    Rehab identified problem list/impairments: weakness, impaired endurance, impaired self care skills, impaired balance, gait instability, impaired functional mobilty, impaired cardiopulmonary response to activity, impaired cognition,  decreased safety awareness, impaired fine motor    Rehab potential is fair    Activity tolerance: fair            GOALS:   Occupational Therapy Goals        Problem: Occupational Therapy Goal    Goal Priority Disciplines Outcome Interventions   Occupational Therapy Goal     OT, PT/OT Ongoing (interventions implemented as appropriate)    Description:  Goals to be met by: 2/19/17     Patient will increase functional independence with ADLs by performing:    UE Dressing with Set-up Assistance and Stand-by Assistance.  LE Dressing with Set-up Assistance and Minimal Assistance.  Grooming while seated with Set-up Assistance.  Toileting from bedside commode with Set-up Assistance, Minimal Assistance and Assistive Devices as needed for hygiene and clothing management.   Toilet transfer to bedside commode with Minimal Assistance.                Plan:  Patient to be seen 3 x/week, 4 x/week to address the above listed problems via self-care/home management, therapeutic activities, therapeutic exercises, cognitive retraining  Plan of Care expires: 02/19/17  Plan of Care reviewed with: patient        Esperanza CHRISTOPHER Davila    2/13/2017

## 2017-02-13 NOTE — PLAN OF CARE
Problem: Physical Therapy Goal  Goal: Physical Therapy Goal  Goals to be met by: 2017     Patient will increase functional independence with mobility by performin. Supine to sit with MInimal Assistance  2. Sit to stand transfer with Minimal Assistance  3. Bed to chair transfer with Minimal Assistance using Rolling Walker  4. Gait x 50 feet with Minimal Assistance using Rolling Walker.   5. Lower extremity exercise program x10 reps per handout, with assistance as needed   Outcome: Ongoing (interventions implemented as appropriate)  Ambulated 40' with rw and Mod A with O2 attached and chair follow for safety.

## 2017-02-13 NOTE — PROGRESS NOTES
Call received from Jessi at [facility] stating that if patient doesn't go to [facility] then they can accept patient for SNF.

## 2017-02-13 NOTE — PLAN OF CARE
Problem: Patient Care Overview  Goal: Plan of Care Review  Outcome: Ongoing (interventions implemented as appropriate)  Alert and confused. Sleep wake cycle noted tonight.   Haldol IVP given x 1. Decrease anxiety observed.   Blood sugar checked with sliding scale coverage.  Fall and injury free this shift. Sitter at bedside.   Bed in low position and locked. Call bell in reach.  Side rails up x 2.   Telemetry

## 2017-02-13 NOTE — PLAN OF CARE
Problem: Patient Care Overview  Goal: Plan of Care Review  Outcome: Ongoing (interventions implemented as appropriate)  Pt on 2 lpm NC. Aerosol tx duoneb Q6.

## 2017-02-13 NOTE — PT/OT/SLP PROGRESS
Physical Therapy  Treatment    Thierry Fu   MRN: [id number]   Admitting Diagnosis: Mild intermittent asthma with acute exacerbation    PT Received On: 17  PT Start Time: 1025     PT Stop Time: 1045    PT Total Time (min): 20 min       Billable Minutes:  Gait [id number] min and Therapeutic Exercise 10min    Treatment Type: Treatment  PT/PTA: PTA     PTA Visit Number: 4       General Precautions: Standard, fall  Orthopedic Precautions: N/A   Braces:           Subjective:  Communicated with nurse Mayi prior to session.  Agreed to mobilize.     Pain Ratin/10                   Objective:   Patient found with: oxygen, telemetry    Functional Mobility:  Bed Mobility:   Rolling/Turning Right: Contact guard assistance  Scooting/Bridging: Contact Guard Assistance  Supine to Sit: Minimum Assistance, With side rail  Sit to Supine: Minimum Assistance, With siderail    Transfers:  Sit <> Stand Assistance: Moderate Assistance  Sit <> Stand Assistive Device: Rolling Walker  Bed <> Chair Technique: Stand Pivot  Bed <> Chair Assistance: Moderate Assistance  Bed <> Chair Assistive Device: Rolling Walker    Gait:   Gait Distance: 40' with O2 attached and chair follow.  Assistance 1: Moderate assistance  Gait Assistive Device: Rolling walker  Gait Pattern: 3-point gait  Gait Deviation(s): decreased gabriela, decreased velocity of limb motion, decreased step length, decreased toe-to-floor clearance    Stairs:      Balance:   Static Sit: FAIR: Maintains without assist, but unable to take any challenges   Dynamic Sit: FAIR+: Maintains balance through MINIMAL excursions of active trunk motion  Static Stand: FAIR: Maintains without assist but unable to take challenges  Dynamic stand: POOR: N/A     Therapeutic Activities and Exercises:  Ambulated 40' with rw and Mod A with chair follow and O2 attached. Required increased cues to stand straight as LE' s wobbly on occasion.  Returned to room in chair and transferred to bed via  SPT and Mod A. ( sitter requested patient return to bed as he was up all morning).  Performed AAROM BLE's at 10 reps each in supine : SLR's, HS, Hip abd / add.     AM-PAC 6 CLICK MOBILITY  How much help from another person does this patient currently need?   1 = Unable, Total/Dependent Assistance  2 = A lot, Maximum/Moderate Assistance  3 = A little, Minimum/Contact Guard/Supervision  4 = None, Modified [place]/Independent         AM-PAC Raw Score CMS G-Code Modifier Level of Impairment Assistance   6 % Total / Unable   7 - 9 CM 80 - 100% Maximal Assist   10 - 14 CL 60 - 80% Moderate Assist   15 - 19 CK 40 - 60% Moderate Assist   20 - 22 CJ 20 - 40% Minimal Assist   23 CI 1-20% SBA / CGA   24 CH 0% Independent/ Mod I     Patient left supine with all lines intact, call button in reach, nurse Mayi notified and caregiver present.    Assessment:  Thierry Fu is a 87 y.o. male with a medical diagnosis of Mild intermittent asthma with acute exacerbation and presents with weakness, limited endurance, decreased cognition.    Rehab identified problem list/impairments: Rehab identified problem list/impairments: weakness, impaired endurance, gait instability, impaired balance, impaired cardiopulmonary response to activity, impaired functional mobilty, impaired self care skills, impaired cognition, decreased safety awareness    Rehab potential is fair.    Activity tolerance: Fair    Discharge recommendations: Discharge Facility/Level Of Care Needs: nursing facility, skilled     Barriers to discharge:      Equipment recommendations: Equipment Needed After Discharge: none     GOALS:   Physical Therapy Goals        Problem: Physical Therapy Goal    Goal Priority Disciplines Outcome Goal Variances Interventions   Physical Therapy Goal    High PT/OT, PT Ongoing (interventions implemented as appropriate)     Description:  Goals to be met by: 02-     Patient will increase functional independence with  mobility by performin. Supine to sit with MInimal Assistance  2. Sit to stand transfer with Minimal Assistance  3. Bed to chair transfer with Minimal Assistance using Rolling Walker  4. Gait  x 50 feet with Minimal Assistance using Rolling Walker.   5. Lower extremity exercise program x10 reps per handout, with assistance as needed                PLAN:    Patient to be seen BID  to address the above listed problems via gait training, therapeutic activities, therapeutic exercises  Plan of Care expires: 17  Plan of Care reviewed with: patient, caregiver         Selin Badillo PTA  2017

## 2017-02-14 PROBLEM — G93.49 UREMIC ENCEPHALOPATHY: Status: ACTIVE | Noted: 2017-02-14

## 2017-02-14 PROBLEM — N19 UREMIC ENCEPHALOPATHY: Status: ACTIVE | Noted: 2017-02-14

## 2017-02-14 PROBLEM — N17.9 AKI (ACUTE KIDNEY INJURY): Status: ACTIVE | Noted: 2017-02-14

## 2017-02-14 LAB
ANION GAP SERPL CALC-SCNC: 11 MMOL/L
BASOPHILS # BLD AUTO: 0 K/UL
BASOPHILS NFR BLD: 0 %
BUN SERPL-MCNC: 105 MG/DL
CALCIUM SERPL-MCNC: 8.1 MG/DL
CHLORIDE SERPL-SCNC: 102 MMOL/L
CO2 SERPL-SCNC: 21 MMOL/L
CREAT SERPL-MCNC: 3 MG/DL
DIFFERENTIAL METHOD: ABNORMAL
EOSINOPHIL # BLD AUTO: 0 K/UL
EOSINOPHIL NFR BLD: 0.1 %
ERYTHROCYTE [DISTWIDTH] IN BLOOD BY AUTOMATED COUNT: 13.9 %
EST. GFR  (AFRICAN AMERICAN): 21 ML/MIN/1.73 M^2
EST. GFR  (NON AFRICAN AMERICAN): 18 ML/MIN/1.73 M^2
GLUCOSE SERPL-MCNC: 151 MG/DL
HCT VFR BLD AUTO: 31.1 %
HGB BLD-MCNC: 10.1 G/DL
LYMPHOCYTES # BLD AUTO: 1.4 K/UL
LYMPHOCYTES NFR BLD: 7.7 %
MAGNESIUM SERPL-MCNC: 2.1 MG/DL
MCH RBC QN AUTO: 29.2 PG
MCHC RBC AUTO-ENTMCNC: 32.4 %
MCV RBC AUTO: 90 FL
MONOCYTES # BLD AUTO: 1 K/UL
MONOCYTES NFR BLD: 5.7 %
NEUTROPHILS # BLD AUTO: 15.6 K/UL
NEUTROPHILS NFR BLD: 86.5 %
PHOSPHATE SERPL-MCNC: 3.8 MG/DL
PLATELET # BLD AUTO: 250 K/UL
PMV BLD AUTO: 8.8 FL
POCT GLUCOSE: 160 MG/DL (ref 70–110)
POCT GLUCOSE: 165 MG/DL (ref 70–110)
POCT GLUCOSE: 166 MG/DL (ref 70–110)
POCT GLUCOSE: 174 MG/DL (ref 70–110)
POTASSIUM SERPL-SCNC: 4.3 MMOL/L
RBC # BLD AUTO: 3.45 M/UL
SODIUM SERPL-SCNC: 134 MMOL/L
WBC # BLD AUTO: 18.1 K/UL

## 2017-02-14 PROCEDURE — 63600175 PHARM REV CODE 636 W HCPCS: Performed by: INTERNAL MEDICINE

## 2017-02-14 PROCEDURE — 84100 ASSAY OF PHOSPHORUS: CPT

## 2017-02-14 PROCEDURE — 63600175 PHARM REV CODE 636 W HCPCS: Performed by: NURSE PRACTITIONER

## 2017-02-14 PROCEDURE — 94761 N-INVAS EAR/PLS OXIMETRY MLT: CPT

## 2017-02-14 PROCEDURE — 80048 BASIC METABOLIC PNL TOTAL CA: CPT

## 2017-02-14 PROCEDURE — 97116 GAIT TRAINING THERAPY: CPT

## 2017-02-14 PROCEDURE — 25000003 PHARM REV CODE 250: Performed by: HOSPITALIST

## 2017-02-14 PROCEDURE — 11000001 HC ACUTE MED/SURG PRIVATE ROOM

## 2017-02-14 PROCEDURE — 25000003 PHARM REV CODE 250: Performed by: NURSE PRACTITIONER

## 2017-02-14 PROCEDURE — 83735 ASSAY OF MAGNESIUM: CPT

## 2017-02-14 PROCEDURE — 36415 COLL VENOUS BLD VENIPUNCTURE: CPT

## 2017-02-14 PROCEDURE — 25000003 PHARM REV CODE 250: Performed by: INTERNAL MEDICINE

## 2017-02-14 PROCEDURE — 27000221 HC OXYGEN, UP TO 24 HOURS

## 2017-02-14 PROCEDURE — 85025 COMPLETE CBC W/AUTO DIFF WBC: CPT

## 2017-02-14 PROCEDURE — 94640 AIRWAY INHALATION TREATMENT: CPT

## 2017-02-14 PROCEDURE — 25000003 PHARM REV CODE 250: Performed by: EMERGENCY MEDICINE

## 2017-02-14 PROCEDURE — 25000242 PHARM REV CODE 250 ALT 637 W/ HCPCS: Performed by: EMERGENCY MEDICINE

## 2017-02-14 RX ORDER — QUETIAPINE FUMARATE 25 MG/1
25 TABLET, FILM COATED ORAL 2 TIMES DAILY
Status: DISCONTINUED | OUTPATIENT
Start: 2017-02-14 | End: 2017-02-16 | Stop reason: HOSPADM

## 2017-02-14 RX ORDER — SODIUM CHLORIDE 9 MG/ML
INJECTION, SOLUTION INTRAVENOUS CONTINUOUS
Status: DISCONTINUED | OUTPATIENT
Start: 2017-02-14 | End: 2017-02-16 | Stop reason: HOSPADM

## 2017-02-14 RX ADMIN — IPRATROPIUM BROMIDE AND ALBUTEROL SULFATE 3 ML: .5; 3 SOLUTION RESPIRATORY (INHALATION) at 12:02

## 2017-02-14 RX ADMIN — ATORVASTATIN CALCIUM 40 MG: 40 TABLET, FILM COATED ORAL at 01:02

## 2017-02-14 RX ADMIN — SOTALOL HYDROCHLORIDE 120 MG: 80 TABLET ORAL at 01:02

## 2017-02-14 RX ADMIN — PANTOPRAZOLE SODIUM 40 MG: 40 TABLET, DELAYED RELEASE ORAL at 01:02

## 2017-02-14 RX ADMIN — SODIUM CHLORIDE 125 ML/HR: 0.9 INJECTION, SOLUTION INTRAVENOUS at 07:02

## 2017-02-14 RX ADMIN — POLYETHYLENE GLYCOL (3350) 17 G: 17 POWDER, FOR SOLUTION ORAL at 01:02

## 2017-02-14 RX ADMIN — RIVASTIGMINE TRANSDERMAL SYSTEM 1 PATCH: 9.5 PATCH, EXTENDED RELEASE TRANSDERMAL at 01:02

## 2017-02-14 RX ADMIN — QUETIAPINE FUMARATE 50 MG: 25 TABLET, FILM COATED ORAL at 01:02

## 2017-02-14 RX ADMIN — HALOPERIDOL LACTATE 5 MG: 5 INJECTION, SOLUTION INTRAMUSCULAR at 03:02

## 2017-02-14 RX ADMIN — PREDNISONE 25 MG: 5 TABLET ORAL at 01:02

## 2017-02-14 RX ADMIN — INSULIN ASPART 4 UNITS: 100 INJECTION, SOLUTION INTRAVENOUS; SUBCUTANEOUS at 01:02

## 2017-02-14 RX ADMIN — IPRATROPIUM BROMIDE AND ALBUTEROL SULFATE 3 ML: .5; 3 SOLUTION RESPIRATORY (INHALATION) at 07:02

## 2017-02-14 RX ADMIN — VALSARTAN 160 MG: 80 TABLET ORAL at 01:02

## 2017-02-14 RX ADMIN — ISOSORBIDE MONONITRATE 60 MG: 60 TABLET, EXTENDED RELEASE ORAL at 01:02

## 2017-02-14 RX ADMIN — IPRATROPIUM BROMIDE AND ALBUTEROL SULFATE 3 ML: .5; 3 SOLUTION RESPIRATORY (INHALATION) at 02:02

## 2017-02-14 RX ADMIN — DULOXETINE 60 MG: 30 CAPSULE, DELAYED RELEASE ORAL at 01:02

## 2017-02-14 RX ADMIN — IPRATROPIUM BROMIDE AND ALBUTEROL SULFATE 3 ML: .5; 3 SOLUTION RESPIRATORY (INHALATION) at 06:02

## 2017-02-14 RX ADMIN — CLOPIDOGREL BISULFATE 75 MG: 75 TABLET ORAL at 01:02

## 2017-02-14 NOTE — PLAN OF CARE
Problem: Physical Therapy Goal  Goal: Physical Therapy Goal  Goals to be met by: 2017     Patient will increase functional independence with mobility by performin. Supine to sit with MInimal Assistance  2. Sit to stand transfer with Minimal Assistance  3. Bed to chair transfer with Minimal Assistance using Rolling Walker  4. Gait x 50 feet with Minimal Assistance using Rolling Walker.   5. Lower extremity exercise program x10 reps per handout, with assistance as needed   Outcome: Ongoing (interventions implemented as appropriate)  Ambulated 25' x 2 with rw and Mod A, rest stop seated taken between each with O2 attached.

## 2017-02-14 NOTE — ASSESSMENT & PLAN NOTE
Creatine stable for now. Monitor UOP and serial BMP and adjust therapy as needed. Renally dose meds.

## 2017-02-14 NOTE — PT/OT/SLP PROGRESS
Physical Therapy      Thierry KISHORE Fu  MRN: [id number]    Patient not seen today secondary to nurse held treatment as patient finally asleep after being up during the night  . Will follow-up later today..    Selin Badillo, PTA

## 2017-02-14 NOTE — PROGRESS NOTES
"1040  Contacted pt's daughter Rylie to f/u on whether [facility] had come to eval patient; per Rylie the nurse did eval patient yesterday evening and stated that it was okay for him to go to [facility].  Daughter voiced concerns regarding "dehydration"; daughter stated "I don't know why they just found this".  Informed daughter that Dr Corrigan will updated family today on pt's condition and plan of care; verbalized understanding.    1044  Updated Jessi at [facility] that patient is not ready for discharge at this time and could possibly d/c to [place].  "

## 2017-02-14 NOTE — PROGRESS NOTES
[facility] Hospital Medicine  Progress Note    Patient Name: Thierry Fu  MRN: [id number]  Patient Class: IP- Inpatient   Admission Date: 2/7/2017  Length of Stay: 6 days  Attending Physician: Severo Corrigan MD  Primary Care Provider: Rosendo Barrow Jr, MD        Subjective:     Principal Problem:Mild intermittent asthma with acute exacerbation    HPI:  Mr. Fu is an 88 yo male  admitted to the services of hospital medicine via the ER for asthma exacerbation. Unable to obtain history and ROS from pt due to dementia. History obtained from ER physician, family and previous epic records. Presents with CC of non-productive cough x 10 days. Associated with wheezing, SOB which are exacerbated by lying down. Started bronchodilator for wheezing and corticosteroids 3 days ago. No improvement noted. Remote history of smoking, quit 60 years ago. History of CAD and PPM. Diabetes type II controlled on oral medications.  Other past medical history as listed below.     Hospital Course:      Interval History: Patient doing much better. Some confusion throughout day, but improved than admit. Wheezing diminished.    Review of Systems   Constitutional: Positive for fatigue. Negative for chills and fever.   Respiratory: Positive for wheezing. Negative for cough and shortness of breath.    Cardiovascular: Negative for chest pain and leg swelling.   Gastrointestinal: Negative for abdominal pain, nausea and vomiting.   Musculoskeletal: Negative for back pain.   Neurological: Negative for weakness.   Psychiatric/Behavioral: Positive for confusion and dysphoric mood. The patient is not nervous/anxious.      Objective:     Vital Signs (Most Recent):  Temp: 97.6 °F (36.4 °C) (02/13/17 1614)  Pulse: 63 (02/13/17 1928)  Resp: 18 (02/13/17 1928)  BP: (!) 102/51 (02/13/17 1614)  SpO2: (!) 93 % (02/13/17 1928) Vital Signs (24h Range):  Temp:  [97.6 °F (36.4 °C)-98.2 °F (36.8 °C)] 97.6 °F (36.4 °C)  Pulse:  [63-88]  63  Resp:  [14-19] 18  SpO2:  [91 %-97 %] 93 %  BP: ()/(51-62) 102/51     Weight: 95.3 kg (210 lb)  Body mass index is 28.48 kg/(m^2).    Intake/Output Summary (Last 24 hours) at 02/13/17 2034  Last data filed at 02/13/17 1359   Gross per 24 hour   Intake              300 ml   Output                0 ml   Net              300 ml      Physical Exam   Constitutional: He is oriented to person, place, and time. No distress.   Frail, confused elderly WM in NAD.   HENT:   Head: Normocephalic and atraumatic.   Eyes: Pupils are equal, round, and reactive to light.   Neck: Neck supple. No thyromegaly present.   Cardiovascular: Normal rate and regular rhythm.  Exam reveals no gallop and no friction rub.    No murmur heard.  Pulmonary/Chest: Effort normal. No respiratory distress. He has wheezes.   Abdominal: Soft. Bowel sounds are normal. He exhibits no distension. There is no tenderness. There is no guarding.   Musculoskeletal: Normal range of motion. He exhibits no edema.   Neurological: He is alert and oriented to person, place, and time.   Occasional confusion   Skin: Skin is warm and dry. No erythema.   Psychiatric: He has a normal mood and affect.   Nursing note and vitals reviewed.      Significant Labs: Patient labs and imaging studies were reviewed and interpreted independently where remarked in assessment and plan where appropriate      Significant Imaging: I have reviewed and interpreted all pertinent imaging results/findings within the past 24 hours.    Assessment/Plan:      * Mild intermittent asthma with acute exacerbation  Improving. Wean steroids, continue nebs and monitor resp status.          Late onset Alzheimer's disease without behavioral disturbance  Dementia is controlled currently. Continue home dementia meds and non-pharmacologic interventions to prevent delirium (No VS between 11PM-5AM, activity during day, opening blinds, providing glasses/hearing aids, and up in chair during daytime). Use PRN  anti-psychotics to prevent behavior of self harm during sundowning, and avoid narcotics and benzos unless absolutely necessary. PRN anti-psychotics prescribed to avoid self harm behaviors.          Essential hypertension, benign  BP low today. Reduce imdur and stop amlodipine. Ok for systolic hypertension  or less.      Chronic kidney disease (CKD), stage III (moderate)  Creatine stable for now. Monitor UOP and serial BMP and adjust therapy as needed. Renally dose meds.        PAF (paroxysmal atrial fibrillation)  Atrial Fibrillation controlled currently with Beta Blocker. [id number] score 4. Anticoagulation not indicated currently. Continue to monitor on telemetry.        Pacemaker  Interrogation done. Working properly.       Type 2 diabetes mellitus with circulatory disorder  FSGs increasing. Add pre-meal bolus + basal and monitor overnight. Adjust as needed. A1C reviewed-  Lab Results   Component Value Date    HGBA1C 6.5 (H) 02/08/2017           Slow transit constipation, resolved as of 2/13/2017  - resolved    VTE Risk Mitigation         Ordered     Medium Risk of VTE  Once      02/07/17 4395          Severo Corrigan MD  Department of Hospital Medicine   [facility]

## 2017-02-14 NOTE — PROGRESS NOTES
02/13/17 1928   Patient Assessment/Suction   Level of Consciousness (AVPU) responds to voice   Respiratory Effort Unlabored   All Lung Fields Breath Sounds clear   PRE-TX-O2-ETCO2   O2 Device (Oxygen Therapy) nasal cannula   $ Is the patient on Oxygen? Yes   Flow (L/min) 2   SpO2 (!) 93 %   Pulse 63   Resp 18   Aerosol Therapy   $ Aerosol Therapy Charges Aerosol Treatment   Respiratory Treatment Status given   SVN/Inhaler Treatment Route mask;with oxygen   Position During Treatment HOB at 30-45 degrees   Patient Tolerance good   Post-Treatment   Post-treatment Heart Rate (beats/min) 66   Post-treatment Resp Rate (breaths/min) 18   All Fields Breath Sounds unchanged

## 2017-02-14 NOTE — ASSESSMENT & PLAN NOTE
Dementia is controlled currently. Continue home dementia meds and non-pharmacologic interventions to prevent delirium (No VS between 11PM-5AM, activity during day, opening blinds, providing glasses/hearing aids, and up in chair during daytime). Use PRN anti-psychotics to prevent behavior of self harm during sundowning, and avoid narcotics and benzos unless absolutely necessary. PRN anti-psychotics prescribed to avoid self harm behaviors.

## 2017-02-14 NOTE — PT/OT/SLP PROGRESS
Physical Therapy  Treatment    Thierry Fu   MRN: [id number]   Admitting Diagnosis: Mild intermittent asthma with acute exacerbation    PT Received On: 17  PT Start Time: 1355     PT Stop Time: 1410    PT Total Time (min): 15 min       Billable Minutes:  Gait [id number] min and Therapeutic Activity 5min    Treatment Type: Treatment  PT/PTA: PTA     PTA Visit Number: 5       General Precautions: Standard, fall  Orthopedic Precautions: N/A   Braces:           Subjective:  Communicated with nurse See prior to session.  Agreed to mobilize.    Pain Ratin/10                   Objective:   Patient found with: oxygen, telemetry, peripheral IV    Functional Mobility:  Bed Mobility:   Rolling/Turning to Left: Minimum assistance  Rolling/Turning Right: Minimum assistance  Supine to Sit: Minimum Assistance, With side rail  Sit to Supine: Minimum Assistance, With siderail    Transfers:  Sit <> Stand Assistance: Moderate Assistance  Sit <> Stand Assistive Device: Rolling Walker    Gait:   Gait Distance: 25' x 2 with rst stop seated between each with O 2 attached.  Assistance 1: Moderate assistance  Gait Assistive Device: Rolling walker  Gait Pattern: 3-point gait  Gait Deviation(s): decreased gabriela, decreased velocity of limb motion, decreased step length, decreased weight-shifting ability, decreased toe-to-floor clearance    Stairs:      Balance:   Static Sit: FAIR: Maintains without assist, but unable to take any challenges   Dynamic Sit: FAIR+: Maintains balance through MINIMAL excursions of active trunk motion  Static Stand: FAIR: Maintains without assist but unable to take challenges  Dynamic stand: POOR: N/A     Therapeutic Activities and Exercises:  Ambulated 25' x 2 with rw and Mod A with IV in tow and O2 attached.  Rest stop seated between each due to LE weakness and wobbly legs. Returned to supine with bed alarm in place, nurse notified.    AM-PAC 6 CLICK MOBILITY  How much help from another person  does this patient currently need?   1 = Unable, Total/Dependent Assistance  2 = A lot, Maximum/Moderate Assistance  3 = A little, Minimum/Contact Guard/Supervision  4 = None, Modified Wake/Independent         AM-PAC Raw Score CMS G-Code Modifier Level of Impairment Assistance   6 % Total / Unable   7 - 9 CM 80 - 100% Maximal Assist   10 - 14 CL 60 - 80% Moderate Assist   15 - 19 CK 40 - 60% Moderate Assist   20 - 22 CJ 20 - 40% Minimal Assist   23 CI 1-20% SBA / CGA   24 CH 0% Independent/ Mod I     Patient left supine with all lines intact, call button in reach, bed alarm on and nurse Mayi notified.    Assessment:  Thierry Fu is a 87 y.o. male with a medical diagnosis of Mild intermittent asthma with acute exacerbation and presents with weakness, decreased cognition.    Rehab identified problem list/impairments: Rehab identified problem list/impairments: weakness, impaired endurance, impaired self care skills, impaired functional mobilty, gait instability, impaired cognition, decreased safety awareness, decreased lower extremity function, impaired balance    Rehab potential is fair.    Activity tolerance: Fair    Discharge recommendations: Discharge Facility/Level Of Care Needs: nursing facility, skilled     Barriers to discharge:      Equipment recommendations: Equipment Needed After Discharge: none     GOALS:   Physical Therapy Goals        Problem: Physical Therapy Goal    Goal Priority Disciplines Outcome Goal Variances Interventions   Physical Therapy Goal    High PT/OT, PT Ongoing (interventions implemented as appropriate)     Description:  Goals to be met by: 2017     Patient will increase functional independence with mobility by performin. Supine to sit with MInimal Assistance  2. Sit to stand transfer with Minimal Assistance  3. Bed to chair transfer with Minimal Assistance using Rolling Walker  4. Gait  x 50 feet with Minimal Assistance using Rolling Walker.   5.  Lower extremity exercise program x10 reps per handout, with assistance as needed                PLAN:    Patient to be seen BID  to address the above listed problems via gait training, therapeutic activities, therapeutic exercises  Plan of Care expires: 02/16/17  Plan of Care reviewed with: patient         Selin Justino, PTA  02/14/2017

## 2017-02-14 NOTE — PLAN OF CARE
Problem: Patient Care Overview  Goal: Plan of Care Review  Outcome: Ongoing (interventions implemented as appropriate)  Patient receiving aerosol tx via duoneb now and 02 saturation 95% on  Nc at 2lpm.

## 2017-02-14 NOTE — PROGRESS NOTES
Calorie Count initiated to be completed 2/14, 15 and 16.  Communication with pt's RN and sitter.  Follow up 2/16.

## 2017-02-14 NOTE — ASSESSMENT & PLAN NOTE
FSGs increasing. Add pre-meal bolus + basal and monitor overnight. Adjust as needed. A1C reviewed-  Lab Results   Component Value Date    HGBA1C 6.5 (H) 02/08/2017

## 2017-02-14 NOTE — SUBJECTIVE & OBJECTIVE
Interval History: Patient doing much better. Some confusion throughout day, but improved than admit. Wheezing diminished.    Review of Systems   Constitutional: Positive for fatigue. Negative for chills and fever.   Respiratory: Positive for wheezing. Negative for cough and shortness of breath.    Cardiovascular: Negative for chest pain and leg swelling.   Gastrointestinal: Negative for abdominal pain, nausea and vomiting.   Musculoskeletal: Negative for back pain.   Neurological: Negative for weakness.   Psychiatric/Behavioral: Positive for confusion and dysphoric mood. The patient is not nervous/anxious.      Objective:     Vital Signs (Most Recent):  Temp: 97.6 °F (36.4 °C) (02/13/17 1614)  Pulse: 63 (02/13/17 1928)  Resp: 18 (02/13/17 1928)  BP: (!) 102/51 (02/13/17 1614)  SpO2: (!) 93 % (02/13/17 1928) Vital Signs (24h Range):  Temp:  [97.6 °F (36.4 °C)-98.2 °F (36.8 °C)] 97.6 °F (36.4 °C)  Pulse:  [63-88] 63  Resp:  [14-19] 18  SpO2:  [91 %-97 %] 93 %  BP: ()/(51-62) 102/51     Weight: 95.3 kg (210 lb)  Body mass index is 28.48 kg/(m^2).    Intake/Output Summary (Last 24 hours) at 02/13/17 2034  Last data filed at 02/13/17 1359   Gross per 24 hour   Intake              300 ml   Output                0 ml   Net              300 ml      Physical Exam   Constitutional: He is oriented to person, place, and time. No distress.   Frail, confused elderly WM in NAD.   HENT:   Head: Normocephalic and atraumatic.   Eyes: Pupils are equal, round, and reactive to light.   Neck: Neck supple. No thyromegaly present.   Cardiovascular: Normal rate and regular rhythm.  Exam reveals no gallop and no friction rub.    No murmur heard.  Pulmonary/Chest: Effort normal. No respiratory distress. He has wheezes.   Abdominal: Soft. Bowel sounds are normal. He exhibits no distension. There is no tenderness. There is no guarding.   Musculoskeletal: Normal range of motion. He exhibits no edema.   Neurological: He is alert and  oriented to person, place, and time.   Occasional confusion   Skin: Skin is warm and dry. No erythema.   Psychiatric: He has a normal mood and affect.   Nursing note and vitals reviewed.      Significant Labs: Patient labs and imaging studies were reviewed and interpreted independently where remarked in assessment and plan where appropriate      Significant Imaging: I have reviewed and interpreted all pertinent imaging results/findings within the past 24 hours.

## 2017-02-14 NOTE — PLAN OF CARE
Problem: Patient Care Overview  Goal: Plan of Care Review  Outcome: Ongoing (interventions implemented as appropriate)  Plan of care and fall risk reviewed.  Pt denies pain or SOB. Urinal place with reach.  Pt remains free from falls/injuries.  Blood glucose monitored and treated as ordered.  Sitter remains at bedside.  Bed in lowest position, wheels locked, side rails up x 4.  Call light within reach. Will continue to monitor.

## 2017-02-14 NOTE — ASSESSMENT & PLAN NOTE
Atrial Fibrillation controlled currently with Beta Blocker. [id number] score 4. Anticoagulation not indicated currently. Continue to monitor on telemetry.

## 2017-02-15 LAB
ANION GAP SERPL CALC-SCNC: 9 MMOL/L
BASOPHILS # BLD AUTO: 0 K/UL
BASOPHILS NFR BLD: 0 %
BUN SERPL-MCNC: 84 MG/DL
CALCIUM SERPL-MCNC: 8.2 MG/DL
CHLORIDE SERPL-SCNC: 109 MMOL/L
CO2 SERPL-SCNC: 23 MMOL/L
CREAT SERPL-MCNC: 2.3 MG/DL
DIFFERENTIAL METHOD: ABNORMAL
EOSINOPHIL # BLD AUTO: 0 K/UL
EOSINOPHIL NFR BLD: 0.1 %
ERYTHROCYTE [DISTWIDTH] IN BLOOD BY AUTOMATED COUNT: 14 %
EST. GFR  (AFRICAN AMERICAN): 28 ML/MIN/1.73 M^2
EST. GFR  (NON AFRICAN AMERICAN): 25 ML/MIN/1.73 M^2
GLUCOSE SERPL-MCNC: 117 MG/DL
HCT VFR BLD AUTO: 33.9 %
HGB BLD-MCNC: 11 G/DL
LYMPHOCYTES # BLD AUTO: 1.3 K/UL
LYMPHOCYTES NFR BLD: 6.5 %
MCH RBC QN AUTO: 29.7 PG
MCHC RBC AUTO-ENTMCNC: 32.3 %
MCV RBC AUTO: 92 FL
MONOCYTES # BLD AUTO: 0.9 K/UL
MONOCYTES NFR BLD: 4.7 %
NEUTROPHILS # BLD AUTO: 17.5 K/UL
NEUTROPHILS NFR BLD: 88.7 %
PLATELET # BLD AUTO: 275 K/UL
PMV BLD AUTO: 8.3 FL
POCT GLUCOSE: 111 MG/DL (ref 70–110)
POCT GLUCOSE: 187 MG/DL (ref 70–110)
POCT GLUCOSE: 205 MG/DL (ref 70–110)
POCT GLUCOSE: 244 MG/DL (ref 70–110)
POTASSIUM SERPL-SCNC: 4.7 MMOL/L
RBC # BLD AUTO: 3.69 M/UL
SODIUM SERPL-SCNC: 141 MMOL/L
WBC # BLD AUTO: 19.7 K/UL

## 2017-02-15 PROCEDURE — 11000001 HC ACUTE MED/SURG PRIVATE ROOM

## 2017-02-15 PROCEDURE — 25000003 PHARM REV CODE 250: Performed by: EMERGENCY MEDICINE

## 2017-02-15 PROCEDURE — 25000242 PHARM REV CODE 250 ALT 637 W/ HCPCS: Performed by: EMERGENCY MEDICINE

## 2017-02-15 PROCEDURE — 94640 AIRWAY INHALATION TREATMENT: CPT

## 2017-02-15 PROCEDURE — 25000003 PHARM REV CODE 250: Performed by: INTERNAL MEDICINE

## 2017-02-15 PROCEDURE — 63600175 PHARM REV CODE 636 W HCPCS: Performed by: INTERNAL MEDICINE

## 2017-02-15 PROCEDURE — 94761 N-INVAS EAR/PLS OXIMETRY MLT: CPT

## 2017-02-15 PROCEDURE — 25000003 PHARM REV CODE 250: Performed by: HOSPITALIST

## 2017-02-15 PROCEDURE — 36415 COLL VENOUS BLD VENIPUNCTURE: CPT

## 2017-02-15 PROCEDURE — 80048 BASIC METABOLIC PNL TOTAL CA: CPT

## 2017-02-15 PROCEDURE — 27000221 HC OXYGEN, UP TO 24 HOURS

## 2017-02-15 PROCEDURE — 25000003 PHARM REV CODE 250: Performed by: NURSE PRACTITIONER

## 2017-02-15 PROCEDURE — 85025 COMPLETE CBC W/AUTO DIFF WBC: CPT

## 2017-02-15 RX ADMIN — IPRATROPIUM BROMIDE AND ALBUTEROL SULFATE 3 ML: .5; 3 SOLUTION RESPIRATORY (INHALATION) at 07:02

## 2017-02-15 RX ADMIN — INSULIN ASPART 1 UNITS: 100 INJECTION, SOLUTION INTRAVENOUS; SUBCUTANEOUS at 09:02

## 2017-02-15 RX ADMIN — IPRATROPIUM BROMIDE AND ALBUTEROL SULFATE 3 ML: .5; 3 SOLUTION RESPIRATORY (INHALATION) at 01:02

## 2017-02-15 RX ADMIN — SODIUM CHLORIDE: 0.9 INJECTION, SOLUTION INTRAVENOUS at 03:02

## 2017-02-15 RX ADMIN — INSULIN ASPART 2 UNITS: 100 INJECTION, SOLUTION INTRAVENOUS; SUBCUTANEOUS at 05:02

## 2017-02-15 RX ADMIN — RIVASTIGMINE TRANSDERMAL SYSTEM 1 PATCH: 9.5 PATCH, EXTENDED RELEASE TRANSDERMAL at 09:02

## 2017-02-15 RX ADMIN — INSULIN ASPART 4 UNITS: 100 INJECTION, SOLUTION INTRAVENOUS; SUBCUTANEOUS at 05:02

## 2017-02-15 RX ADMIN — CLOPIDOGREL BISULFATE 75 MG: 75 TABLET ORAL at 09:02

## 2017-02-15 RX ADMIN — DULOXETINE 60 MG: 30 CAPSULE, DELAYED RELEASE ORAL at 09:02

## 2017-02-15 RX ADMIN — INSULIN ASPART 4 UNITS: 100 INJECTION, SOLUTION INTRAVENOUS; SUBCUTANEOUS at 11:02

## 2017-02-15 RX ADMIN — PREDNISONE 25 MG: 5 TABLET ORAL at 09:02

## 2017-02-15 RX ADMIN — PANTOPRAZOLE SODIUM 40 MG: 40 TABLET, DELAYED RELEASE ORAL at 09:02

## 2017-02-15 RX ADMIN — SOTALOL HYDROCHLORIDE 120 MG: 80 TABLET ORAL at 09:02

## 2017-02-15 RX ADMIN — POLYETHYLENE GLYCOL (3350) 17 G: 17 POWDER, FOR SOLUTION ORAL at 09:02

## 2017-02-15 RX ADMIN — ISOSORBIDE MONONITRATE 60 MG: 60 TABLET, EXTENDED RELEASE ORAL at 09:02

## 2017-02-15 RX ADMIN — ATORVASTATIN CALCIUM 40 MG: 40 TABLET, FILM COATED ORAL at 09:02

## 2017-02-15 RX ADMIN — QUETIAPINE FUMARATE 25 MG: 25 TABLET, FILM COATED ORAL at 09:02

## 2017-02-15 RX ADMIN — INSULIN ASPART 4 UNITS: 100 INJECTION, SOLUTION INTRAVENOUS; SUBCUTANEOUS at 07:02

## 2017-02-15 NOTE — PLAN OF CARE
Problem: Physical Therapy Goal  Goal: Physical Therapy Goal  Goals to be met by: 2017     Patient will increase functional independence with mobility by performin. Supine to sit with MInimal Assistance  2. Sit to stand transfer with Minimal Assistance  3. Bed to chair transfer with Minimal Assistance using Rolling Walker  4. Gait x 50 feet with Minimal Assistance using Rolling Walker.   5. Lower extremity exercise program x10 reps per handout, with assistance as needed   Outcome: Ongoing (interventions implemented as appropriate)  Pt seen BID today- ambulated short distance with O2 at 2 liters SAT 93%- OOB to chair x 2 hours. Completed sit to stand exercises  X 5 reps. Thera ex to bilateral LE's  X 10

## 2017-02-15 NOTE — ASSESSMENT & PLAN NOTE
Atrial Fibrillation controlled currently with Beta Blocker. [id number] score 4. Anticoagulation not indicated currently. Continue to monitor on telemetry.

## 2017-02-15 NOTE — PROGRESS NOTES
02/15/17 0731   Patient Assessment/Suction   Level of Consciousness (AVPU) responds to voice   All Lung Fields Breath Sounds diminished   PRE-TX-O2-ETCO2   O2 Device (Oxygen Therapy) nasal cannula   $ Is the patient on Oxygen? Yes   Flow (L/min) 2   SpO2 (!) 92 %   Pulse Oximetry Type Intermittent   $ Pulse Oximetry - Multiple Charge Pulse Oximetry - Multiple   Pulse 61   Resp 18   Aerosol Therapy   $ Aerosol Therapy Charges Aerosol Treatment   Respiratory Treatment Status given   SVN/Inhaler Treatment Route mask   Position During Treatment HOB at 60 degrees   Patient Tolerance good   Post-Treatment   Post-treatment Heart Rate (beats/min) 65   Post-treatment Resp Rate (breaths/min) 16   All Fields Breath Sounds aeration increased   duoneb q6 mask tx, tolerated well, NC 2L, vitals as charted.

## 2017-02-15 NOTE — SUBJECTIVE & OBJECTIVE
Interval History: Patient much more confused, with decreased UOP overnight. Lethargic today. Discussed with family in detail.    Review of Systems   Constitutional: Positive for fatigue. Negative for chills and fever.   Respiratory: Positive for wheezing. Negative for cough and shortness of breath.    Cardiovascular: Negative for chest pain and leg swelling.   Gastrointestinal: Negative for abdominal pain, nausea and vomiting.   Musculoskeletal: Negative for back pain.   Neurological: Negative for weakness.   Psychiatric/Behavioral: Positive for confusion and dysphoric mood. The patient is not nervous/anxious.      Objective:     Vital Signs (Most Recent):  Temp: 97.8 °F (36.6 °C) (02/14/17 1600)  Pulse: 76 (02/14/17 1915)  Resp: 15 (02/14/17 1915)  BP: (!) 124/56 (02/14/17 1600)  SpO2: (!) 93 % (02/14/17 1915) Vital Signs (24h Range):  Temp:  [97.5 °F (36.4 °C)-98.3 °F (36.8 °C)] 97.8 °F (36.6 °C)  Pulse:  [59-88] 76  Resp:  [15-18] 15  SpO2:  [90 %-95 %] 93 %  BP: ()/(53-56) 124/56     Weight: 95.3 kg (210 lb)  Body mass index is 28.48 kg/(m^2).  No intake or output data in the 24 hours ending 02/14/17 2034   Physical Exam   Constitutional: No distress.   Frail, confused elderly WM in NAD.   HENT:   Head: Normocephalic and atraumatic.   Eyes: Pupils are equal, round, and reactive to light.   Neck: Neck supple. No thyromegaly present.   Cardiovascular: Normal rate and regular rhythm.  Exam reveals no gallop and no friction rub.    No murmur heard.  Pulmonary/Chest: Effort normal. No respiratory distress. He has wheezes.   Abdominal: Soft. Bowel sounds are normal. He exhibits no distension. There is no tenderness. There is no guarding.   Musculoskeletal: Normal range of motion. He exhibits no edema.   Neurological:   Lethargic, awakens briefly but confused   Skin: Skin is warm and dry. No erythema.   Psychiatric: He has a normal mood and affect.   Nursing note and vitals reviewed.      Significant Labs:  Patient labs and imaging studies were reviewed and interpreted independently where remarked in assessment and plan where appropriate      Significant Imaging: I have reviewed and interpreted all pertinent imaging results/findings within the past 24 hours.

## 2017-02-15 NOTE — PROGRESS NOTES
02/14/17 1915   Patient Assessment/Suction   Level of Consciousness (AVPU) responds to voice   Respiratory Effort Unlabored;Normal   All Lung Fields Breath Sounds diminished   PRE-TX-O2-ETCO2   O2 Device (Oxygen Therapy) nasal cannula   $ Is the patient on Oxygen? Yes   Flow (L/min) 2   SpO2 (!) 93 %   Pulse Oximetry Type Intermittent   $ Pulse Oximetry - Multiple Charge Pulse Oximetry - Multiple   Pulse 76   Resp 15   Aerosol Therapy   $ Aerosol Therapy Charges Aerosol Treatment   Respiratory Treatment Status given   SVN/Inhaler Treatment Route mask   Position During Treatment HOB at 30-45 degrees   Patient Tolerance good   Post-Treatment   Post-treatment Heart Rate (beats/min) 75   Post-treatment Resp Rate (breaths/min) 15   All Fields Breath Sounds aeration increased

## 2017-02-15 NOTE — ASSESSMENT & PLAN NOTE
Now with MIKEY- Will hydrate with normal saline and monitor repeat electrolytes and UOP. BMP reviewed-  BMP  Lab Results   Component Value Date     (L) 02/14/2017    K 4.3 02/14/2017     02/14/2017    CO2 21 (L) 02/14/2017     (H) 02/14/2017    CREATININE 3.0 (H) 02/14/2017    CALCIUM 8.1 (L) 02/14/2017    ANIONGAP 11 02/14/2017    ESTGFRAFRICA 21 (A) 02/14/2017    EGFRNONAA 18 (A) 02/14/2017

## 2017-02-15 NOTE — PROGRESS NOTES
[facility] Hospital Medicine  Progress Note    Patient Name: Thierry Fu  MRN: [id number]  Patient Class: IP- Inpatient   Admission Date: 2/7/2017  Length of Stay: 7 days  Attending Physician: Severo Corrigan MD  Primary Care Provider: Rosendo Barrow Jr, MD        Subjective:     Principal Problem:Mild intermittent asthma with acute exacerbation    HPI:  Mr. Fu is an 86 yo male  admitted to the services of hospital medicine via the ER for asthma exacerbation. Unable to obtain history and ROS from pt due to dementia. History obtained from ER physician, family and previous epic records. Presents with CC of non-productive cough x 10 days. Associated with wheezing, SOB which are exacerbated by lying down. Started bronchodilator for wheezing and corticosteroids 3 days ago. No improvement noted. Remote history of smoking, quit 60 years ago. History of CAD and PPM. Diabetes type II controlled on oral medications.  Other past medical history as listed below.       Interval History: Patient much more confused, with decreased UOP overnight. Lethargic today. Discussed with family in detail.    Review of Systems   Constitutional: Positive for fatigue. Negative for chills and fever.   Respiratory: Positive for wheezing. Negative for cough and shortness of breath.    Cardiovascular: Negative for chest pain and leg swelling.   Gastrointestinal: Negative for abdominal pain, nausea and vomiting.   Musculoskeletal: Negative for back pain.   Neurological: Negative for weakness.   Psychiatric/Behavioral: Positive for confusion and dysphoric mood. The patient is not nervous/anxious.      Objective:     Vital Signs (Most Recent):  Temp: 97.8 °F (36.6 °C) (02/14/17 1600)  Pulse: 76 (02/14/17 1915)  Resp: 15 (02/14/17 1915)  BP: (!) 124/56 (02/14/17 1600)  SpO2: (!) 93 % (02/14/17 1915) Vital Signs (24h Range):  Temp:  [97.5 °F (36.4 °C)-98.3 °F (36.8 °C)] 97.8 °F (36.6 °C)  Pulse:  [59-88] 76  Resp:   [15-18] 15  SpO2:  [90 %-95 %] 93 %  BP: ()/(53-56) 124/56     Weight: 95.3 kg (210 lb)  Body mass index is 28.48 kg/(m^2).  No intake or output data in the 24 hours ending 02/14/17 2034   Physical Exam   Constitutional: No distress.   Frail, confused elderly WM in NAD.   HENT:   Head: Normocephalic and atraumatic.   Eyes: Pupils are equal, round, and reactive to light.   Neck: Neck supple. No thyromegaly present.   Cardiovascular: Normal rate and regular rhythm.  Exam reveals no gallop and no friction rub.    No murmur heard.  Pulmonary/Chest: Effort normal. No respiratory distress. He has wheezes.   Abdominal: Soft. Bowel sounds are normal. He exhibits no distension. There is no tenderness. There is no guarding.   Musculoskeletal: Normal range of motion. He exhibits no edema.   Neurological:   Lethargic, awakens briefly but confused   Skin: Skin is warm and dry. No erythema.   Psychiatric: He has a normal mood and affect.   Nursing note and vitals reviewed.      Significant Labs: Patient labs and imaging studies were reviewed and interpreted independently where remarked in assessment and plan where appropriate      Significant Imaging: I have reviewed and interpreted all pertinent imaging results/findings within the past 24 hours.    Assessment/Plan:      * Mild intermittent asthma with acute exacerbation  Improving. Wean steroids, continue nebs and monitor resp status.          Late onset Alzheimer's disease without behavioral disturbance  Dementia is controlled currently. Continue home dementia meds and non-pharmacologic interventions to prevent delirium (No VS between 11PM-5AM, activity during day, opening blinds, providing glasses/hearing aids, and up in chair during daytime). Use PRN anti-psychotics to prevent behavior of self harm during sundowning, and avoid narcotics and benzos unless absolutely necessary. PRN anti-psychotics prescribed to avoid self harm behaviors.          Essential hypertension,  benign  BP stable today. Reduced imdur and stop amlodipine 2/13. Ok for systolic hypertension  or less.      Chronic kidney disease (CKD), stage III (moderate)  Now with MIKEY- Will hydrate with normal saline and monitor repeat electrolytes and UOP. BMP reviewed-  BMP  Lab Results   Component Value Date     (L) 02/14/2017    K 4.3 02/14/2017     02/14/2017    CO2 21 (L) 02/14/2017     (H) 02/14/2017    CREATININE 3.0 (H) 02/14/2017    CALCIUM 8.1 (L) 02/14/2017    ANIONGAP 11 02/14/2017    ESTGFRAFRICA 21 (A) 02/14/2017    EGFRNONAA 18 (A) 02/14/2017             PAF (paroxysmal atrial fibrillation)  Atrial Fibrillation controlled currently with Beta Blocker. [id number] score 4. Anticoagulation not indicated currently. Continue to monitor on telemetry.        Pacemaker  Interrogation done. Working properly.       Type 2 diabetes mellitus with circulatory disorder  FSGs controlled with pre-meal bolus + basal. Adjust as needed. A1C reviewed-  Lab Results   Component Value Date    HGBA1C 6.5 (H) 02/08/2017           MIKEY (acute kidney injury)  New onset- hydrate and monitor UOP/BMP.       Uremic encephalopathy  New onset- BUN>100- likely affecting mental status. Will hydrate and trend uremia.      VTE Risk Mitigation         Ordered     Medium Risk of VTE  Once      02/07/17 6357          Severo Corrigan MD  Department of Hospital Medicine   [facility]

## 2017-02-15 NOTE — ASSESSMENT & PLAN NOTE
FSGs controlled with pre-meal bolus + basal. Adjust as needed. A1C reviewed-  Lab Results   Component Value Date    HGBA1C 6.5 (H) 02/08/2017

## 2017-02-15 NOTE — PLAN OF CARE
Problem: Patient Care Overview  Goal: Plan of Care Review  Outcome: Ongoing (interventions implemented as appropriate)  Plan of care and fall risk reviewed.  Incontinence care and weight shift assistance provided. Pt denies pain or SOB.  Remains free from falls/injuries.  Blood glucose monitored and treated as ordered.  IV fluids infusing as ordered.  Bed in lowest position, wheels locked, side rails up , alarm set.  Call light within reach.  Will continue to monitor.

## 2017-02-15 NOTE — PLAN OF CARE
Problem: Patient Care Overview  Goal: Plan of Care Review  Outcome: Ongoing (interventions implemented as appropriate)  Pt more alert today. Sat up in chair 4-5 hours. Up with assist to bedside commode. Urinal with assist. Boost ordered with meals. Blood glucose monitoring, insulin given per order. NS infusing 125 cc/hr. Bed in lowest position, wheels locked, call light within reach. Will continue to monitor.

## 2017-02-16 VITALS
HEART RATE: 69 BPM | WEIGHT: 210 LBS | OXYGEN SATURATION: 92 % | BODY MASS INDEX: 28.44 KG/M2 | DIASTOLIC BLOOD PRESSURE: 68 MMHG | RESPIRATION RATE: 18 BRPM | SYSTOLIC BLOOD PRESSURE: 157 MMHG | HEIGHT: 72 IN | TEMPERATURE: 97 F

## 2017-02-16 DIAGNOSIS — J20.9 ACUTE BRONCHITIS, UNSPECIFIED ORGANISM: ICD-10-CM

## 2017-02-16 DIAGNOSIS — E78.2 MIXED HYPERLIPIDEMIA: ICD-10-CM

## 2017-02-16 DIAGNOSIS — I50.32 CONGESTIVE HEART FAILURE WITH LEFT VENTRICULAR DIASTOLIC DYSFUNCTION, CHRONIC: ICD-10-CM

## 2017-02-16 DIAGNOSIS — I25.10 CORONARY ARTERY DISEASE INVOLVING NATIVE CORONARY ARTERY OF NATIVE HEART WITHOUT ANGINA PECTORIS: ICD-10-CM

## 2017-02-16 DIAGNOSIS — I48.0 PAF (PAROXYSMAL ATRIAL FIBRILLATION): ICD-10-CM

## 2017-02-16 DIAGNOSIS — F02.80 DEMENTIA ASSOCIATED WITH OTHER UNDERLYING DISEASE WITHOUT BEHAVIORAL DISTURBANCE: ICD-10-CM

## 2017-02-16 DIAGNOSIS — F32.89 OTHER DEPRESSION: ICD-10-CM

## 2017-02-16 DIAGNOSIS — E11.9 TYPE 2 DIABETES MELLITUS WITHOUT COMPLICATION, WITHOUT LONG-TERM CURRENT USE OF INSULIN: ICD-10-CM

## 2017-02-16 LAB
ANION GAP SERPL CALC-SCNC: 6 MMOL/L
BUN SERPL-MCNC: 56 MG/DL
CALCIUM SERPL-MCNC: 8.1 MG/DL
CHLORIDE SERPL-SCNC: 109 MMOL/L
CO2 SERPL-SCNC: 24 MMOL/L
CREAT SERPL-MCNC: 1.8 MG/DL
EST. GFR  (AFRICAN AMERICAN): 38 ML/MIN/1.73 M^2
EST. GFR  (NON AFRICAN AMERICAN): 33 ML/MIN/1.73 M^2
GLUCOSE SERPL-MCNC: 61 MG/DL
POCT GLUCOSE: 129 MG/DL (ref 70–110)
POCT GLUCOSE: 62 MG/DL (ref 70–110)
POCT GLUCOSE: 95 MG/DL (ref 70–110)
POTASSIUM SERPL-SCNC: 4.2 MMOL/L
SODIUM SERPL-SCNC: 139 MMOL/L

## 2017-02-16 PROCEDURE — 25000003 PHARM REV CODE 250: Performed by: EMERGENCY MEDICINE

## 2017-02-16 PROCEDURE — 97530 THERAPEUTIC ACTIVITIES: CPT

## 2017-02-16 PROCEDURE — 97116 GAIT TRAINING THERAPY: CPT

## 2017-02-16 PROCEDURE — 27000221 HC OXYGEN, UP TO 24 HOURS

## 2017-02-16 PROCEDURE — 97535 SELF CARE MNGMENT TRAINING: CPT

## 2017-02-16 PROCEDURE — 80048 BASIC METABOLIC PNL TOTAL CA: CPT

## 2017-02-16 PROCEDURE — 94761 N-INVAS EAR/PLS OXIMETRY MLT: CPT

## 2017-02-16 PROCEDURE — 36415 COLL VENOUS BLD VENIPUNCTURE: CPT

## 2017-02-16 PROCEDURE — 97110 THERAPEUTIC EXERCISES: CPT

## 2017-02-16 PROCEDURE — 25000003 PHARM REV CODE 250: Performed by: HOSPITALIST

## 2017-02-16 PROCEDURE — 94640 AIRWAY INHALATION TREATMENT: CPT

## 2017-02-16 PROCEDURE — 25000003 PHARM REV CODE 250: Performed by: INTERNAL MEDICINE

## 2017-02-16 PROCEDURE — 25000242 PHARM REV CODE 250 ALT 637 W/ HCPCS: Performed by: EMERGENCY MEDICINE

## 2017-02-16 PROCEDURE — 63600175 PHARM REV CODE 636 W HCPCS: Performed by: INTERNAL MEDICINE

## 2017-02-16 PROCEDURE — 25000003 PHARM REV CODE 250: Performed by: NURSE PRACTITIONER

## 2017-02-16 RX ORDER — PREDNISONE 10 MG/1
10 TABLET ORAL DAILY
Qty: 4 TABLET | Refills: 0 | Status: SHIPPED | OUTPATIENT
Start: 2017-02-16 | End: 2017-02-16

## 2017-02-16 RX ORDER — AMOXICILLIN 250 MG
1 CAPSULE ORAL 2 TIMES DAILY
COMMUNITY
Start: 2017-02-16

## 2017-02-16 RX ORDER — INSULIN ASPART 100 [IU]/ML
0-5 INJECTION, SOLUTION INTRAVENOUS; SUBCUTANEOUS
Qty: 3 ML | Refills: 0 | Status: SHIPPED | OUTPATIENT
Start: 2017-02-16 | End: 2017-02-16

## 2017-02-16 RX ORDER — FERROUS GLUCONATE 324(38)MG
324 TABLET ORAL 2 TIMES DAILY WITH MEALS
COMMUNITY
Start: 2017-02-16

## 2017-02-16 RX ORDER — INSULIN LISPRO 100 [IU]/ML
INJECTION, SOLUTION INTRAVENOUS; SUBCUTANEOUS
Qty: 15 ML | Refills: 11 | Status: SHIPPED | OUTPATIENT
Start: 2017-02-16

## 2017-02-16 RX ORDER — POLYETHYLENE GLYCOL 3350 17 G/17G
17 POWDER, FOR SOLUTION ORAL DAILY
Qty: 30 EACH | Refills: 0 | Status: SHIPPED | OUTPATIENT
Start: 2017-02-16 | End: 2017-02-16

## 2017-02-16 RX ORDER — INSULIN GLARGINE 100 [IU]/ML
18 INJECTION, SOLUTION SUBCUTANEOUS DAILY
Qty: 1 SYRINGE | Refills: 11 | Status: SHIPPED | OUTPATIENT
Start: 2017-02-16 | End: 2017-02-16

## 2017-02-16 RX ORDER — INSULIN GLARGINE 100 [IU]/ML
18 INJECTION, SOLUTION SUBCUTANEOUS DAILY
Qty: 1 SYRINGE | Refills: 11 | Status: SHIPPED | OUTPATIENT
Start: 2017-02-16

## 2017-02-16 RX ORDER — ISOSORBIDE MONONITRATE 60 MG/1
60 TABLET, EXTENDED RELEASE ORAL DAILY
Qty: 30 TABLET | Refills: 11 | Status: SHIPPED | OUTPATIENT
Start: 2017-02-16 | End: 2017-02-16

## 2017-02-16 RX ORDER — ACETAMINOPHEN 325 MG/1
650 TABLET ORAL EVERY 6 HOURS PRN
Qty: 30 TABLET | Refills: 0 | Status: SHIPPED | OUTPATIENT
Start: 2017-02-16

## 2017-02-16 RX ORDER — ACETAMINOPHEN 325 MG/1
650 TABLET ORAL EVERY 6 HOURS PRN
Refills: 0 | COMMUNITY
Start: 2017-02-16 | End: 2017-02-16

## 2017-02-16 RX ORDER — INSULIN GLARGINE 100 [IU]/ML
18 INJECTION, SOLUTION SUBCUTANEOUS DAILY
Qty: 1 SYRINGE | Refills: 11 | Status: SHIPPED | OUTPATIENT
Start: 2017-02-16 | End: 2017-02-16 | Stop reason: SDUPTHER

## 2017-02-16 RX ORDER — DULOXETIN HYDROCHLORIDE 60 MG/1
60 CAPSULE, DELAYED RELEASE ORAL DAILY
Qty: 30 CAPSULE | Refills: 11 | Status: SHIPPED | OUTPATIENT
Start: 2017-02-16

## 2017-02-16 RX ORDER — CLOPIDOGREL BISULFATE 75 MG/1
75 TABLET ORAL DAILY
Qty: 30 TABLET | Refills: 11 | Status: SHIPPED | OUTPATIENT
Start: 2017-02-16

## 2017-02-16 RX ORDER — QUETIAPINE FUMARATE 25 MG/1
25 TABLET, FILM COATED ORAL 2 TIMES DAILY
Qty: 60 TABLET | Refills: 0 | Status: SHIPPED | OUTPATIENT
Start: 2017-02-16 | End: 2018-02-16

## 2017-02-16 RX ORDER — RIVASTIGMINE 9.5 MG/24H
PATCH, EXTENDED RELEASE TRANSDERMAL
Qty: 30 PATCH | Refills: 11 | Status: SHIPPED | OUTPATIENT
Start: 2017-02-16

## 2017-02-16 RX ORDER — POLYETHYLENE GLYCOL 3350 17 G/17G
17 POWDER, FOR SOLUTION ORAL DAILY
Qty: 30 EACH | Refills: 0 | Status: SHIPPED | OUTPATIENT
Start: 2017-02-16

## 2017-02-16 RX ORDER — SIMVASTATIN 40 MG/1
40 TABLET, FILM COATED ORAL NIGHTLY
Qty: 30 TABLET | Refills: 11 | Status: SHIPPED | OUTPATIENT
Start: 2017-02-16

## 2017-02-16 RX ORDER — PREDNISONE 10 MG/1
10 TABLET ORAL DAILY
Qty: 4 TABLET | Refills: 0 | Status: SHIPPED | OUTPATIENT
Start: 2017-02-16 | End: 2017-02-20

## 2017-02-16 RX ORDER — INSULIN ASPART 100 [IU]/ML
0-5 INJECTION, SOLUTION INTRAVENOUS; SUBCUTANEOUS
Qty: 3 ML | Refills: 0 | Status: SHIPPED | OUTPATIENT
Start: 2017-02-16 | End: 2018-02-16

## 2017-02-16 RX ORDER — ISOSORBIDE MONONITRATE 60 MG/1
60 TABLET, EXTENDED RELEASE ORAL DAILY
Qty: 30 TABLET | Refills: 11 | Status: SHIPPED | OUTPATIENT
Start: 2017-02-16 | End: 2017-02-16 | Stop reason: SDUPTHER

## 2017-02-16 RX ORDER — INSULIN GLARGINE 100 [IU]/ML
18 INJECTION, SOLUTION SUBCUTANEOUS DAILY
Qty: 1 ML | Refills: 11 | Status: SHIPPED | OUTPATIENT
Start: 2017-02-16

## 2017-02-16 RX ORDER — PANTOPRAZOLE SODIUM 40 MG/1
40 TABLET, DELAYED RELEASE ORAL DAILY
Qty: 30 TABLET | Refills: 11 | Status: SHIPPED | OUTPATIENT
Start: 2017-02-16 | End: 2018-02-16

## 2017-02-16 RX ORDER — SOTALOL HYDROCHLORIDE 120 MG/1
TABLET ORAL
Qty: 60 TABLET | Refills: 11 | Status: SHIPPED | OUTPATIENT
Start: 2017-02-16

## 2017-02-16 RX ORDER — ISOSORBIDE MONONITRATE 60 MG/1
60 TABLET, EXTENDED RELEASE ORAL DAILY
Qty: 30 TABLET | Refills: 11 | Status: SHIPPED | OUTPATIENT
Start: 2017-02-16 | End: 2018-02-16

## 2017-02-16 RX ORDER — ALBUTEROL SULFATE 90 UG/1
2 AEROSOL, METERED RESPIRATORY (INHALATION) EVERY 6 HOURS PRN
Qty: 1 INHALER | Refills: 3 | Status: SHIPPED | OUTPATIENT
Start: 2017-02-16

## 2017-02-16 RX ORDER — NITROGLYCERIN 400 UG/1
SPRAY ORAL
Qty: 12 G | Refills: 10 | Status: SHIPPED | OUTPATIENT
Start: 2017-02-16

## 2017-02-16 RX ADMIN — INSULIN ASPART 4 UNITS: 100 INJECTION, SOLUTION INTRAVENOUS; SUBCUTANEOUS at 11:02

## 2017-02-16 RX ADMIN — PANTOPRAZOLE SODIUM 40 MG: 40 TABLET, DELAYED RELEASE ORAL at 10:02

## 2017-02-16 RX ADMIN — INSULIN ASPART 4 UNITS: 100 INJECTION, SOLUTION INTRAVENOUS; SUBCUTANEOUS at 08:02

## 2017-02-16 RX ADMIN — PREDNISONE 25 MG: 5 TABLET ORAL at 10:02

## 2017-02-16 RX ADMIN — SOTALOL HYDROCHLORIDE 120 MG: 80 TABLET ORAL at 10:02

## 2017-02-16 RX ADMIN — ISOSORBIDE MONONITRATE 60 MG: 60 TABLET, EXTENDED RELEASE ORAL at 10:02

## 2017-02-16 RX ADMIN — INSULIN ASPART 4 UNITS: 100 INJECTION, SOLUTION INTRAVENOUS; SUBCUTANEOUS at 04:02

## 2017-02-16 RX ADMIN — ATORVASTATIN CALCIUM 40 MG: 40 TABLET, FILM COATED ORAL at 10:02

## 2017-02-16 RX ADMIN — IPRATROPIUM BROMIDE AND ALBUTEROL SULFATE 3 ML: .5; 3 SOLUTION RESPIRATORY (INHALATION) at 01:02

## 2017-02-16 RX ADMIN — IPRATROPIUM BROMIDE AND ALBUTEROL SULFATE 3 ML: .5; 3 SOLUTION RESPIRATORY (INHALATION) at 07:02

## 2017-02-16 RX ADMIN — POLYETHYLENE GLYCOL (3350) 17 G: 17 POWDER, FOR SOLUTION ORAL at 10:02

## 2017-02-16 RX ADMIN — RIVASTIGMINE TRANSDERMAL SYSTEM 1 PATCH: 9.5 PATCH, EXTENDED RELEASE TRANSDERMAL at 10:02

## 2017-02-16 RX ADMIN — DULOXETINE 60 MG: 30 CAPSULE, DELAYED RELEASE ORAL at 10:02

## 2017-02-16 RX ADMIN — CLOPIDOGREL BISULFATE 75 MG: 75 TABLET ORAL at 10:02

## 2017-02-16 RX ADMIN — QUETIAPINE FUMARATE 25 MG: 25 TABLET, FILM COATED ORAL at 10:02

## 2017-02-16 NOTE — PROGRESS NOTES
[facility] Hospital Medicine  Progress Note    Patient Name: Thierry Fu  MRN: [id number]  Patient Class: IP- Inpatient   Admission Date: 2/7/2017  Length of Stay: 8 days  Attending Physician: Severo Corrigan MD  Primary Care Provider: Rosendo Barrow Jr, MD        Subjective:     Principal Problem:Mild intermittent asthma with acute exacerbation    HPI:  Mr. Fu is an 86 yo male  admitted to the services of hospital medicine via the ER for asthma exacerbation. Unable to obtain history and ROS from pt due to dementia. History obtained from ER physician, family and previous epic records. Presents with CC of non-productive cough x 10 days. Associated with wheezing, SOB which are exacerbated by lying down. Started bronchodilator for wheezing and corticosteroids 3 days ago. No improvement noted. Remote history of smoking, quit 60 years ago. History of CAD and PPM. Diabetes type II controlled on oral medications.  Other past medical history as listed below.       Interval History: Patient confusion improved today after fluids. Discussed with family. Improving.    Review of Systems   Constitutional: Positive for fatigue. Negative for chills and fever.   Respiratory: Positive for wheezing. Negative for cough and shortness of breath.    Cardiovascular: Negative for chest pain and leg swelling.   Gastrointestinal: Negative for abdominal pain, nausea and vomiting.   Musculoskeletal: Negative for back pain.   Neurological: Negative for weakness.   Psychiatric/Behavioral: Positive for confusion and dysphoric mood. The patient is not nervous/anxious.      Objective:     Vital Signs (Most Recent):  Temp: 97.6 °F (36.4 °C) (02/15/17 1500)  Pulse: 65 (02/15/17 1924)  Resp: 14 (02/15/17 1924)  BP: 139/64 (02/15/17 1500)  SpO2: 97 % (02/15/17 1924) Vital Signs (24h Range):  Temp:  [97.6 °F (36.4 °C)-98.6 °F (37 °C)] 97.6 °F (36.4 °C)  Pulse:  [61-77] 65  Resp:  [14-18] 14  SpO2:  [92 %-98 %] 97 %  BP:  (118-164)/(53-68) 139/64     Weight: 95.3 kg (210 lb)  Body mass index is 28.48 kg/(m^2).    Intake/Output Summary (Last 24 hours) at 02/15/17 2019  Last data filed at 02/15/17 0500   Gross per 24 hour   Intake             1300 ml   Output                0 ml   Net             1300 ml      Physical Exam   Constitutional: No distress.   Frail, confused elderly WM in NAD.   HENT:   Head: Normocephalic and atraumatic.   Eyes: Pupils are equal, round, and reactive to light.   Neck: Neck supple. No thyromegaly present.   Cardiovascular: Normal rate and regular rhythm.  Exam reveals no gallop and no friction rub.    No murmur heard.  Pulmonary/Chest: Effort normal. No respiratory distress. He has wheezes.   Abdominal: Soft. Bowel sounds are normal. He exhibits no distension. There is no tenderness. There is no guarding.   Musculoskeletal: Normal range of motion. He exhibits no edema.   Neurological:   Lethargic, awakens briefly but confused   Skin: Skin is warm and dry. No erythema.   Psychiatric: He has a normal mood and affect.   Nursing note and vitals reviewed.      Significant Labs: Patient labs and imaging studies were reviewed and interpreted independently where remarked in assessment and plan where appropriate      Significant Imaging: I have reviewed and interpreted all pertinent imaging results/findings within the past 24 hours.    Assessment/Plan:      * Mild intermittent asthma with acute exacerbation  Improving. Wean steroids, continue nebs and monitor resp status.          Late onset Alzheimer's disease without behavioral disturbance  Dementia is controlled currently. Continue home dementia meds and non-pharmacologic interventions to prevent delirium (No VS between 11PM-5AM, activity during day, opening blinds, providing glasses/hearing aids, and up in chair during daytime). Use PRN anti-psychotics to prevent behavior of self harm during sundowning, and avoid narcotics and benzos unless absolutely necessary.  PRN anti-psychotics prescribed to avoid self harm behaviors.          Essential hypertension, benign  BP stable today. Reduced imdur and stop amlodipine 2/13. Ok for systolic hypertension  or less.      Chronic kidney disease (CKD), stage III (moderate)  Now with MIKEY- Will hydrate with normal saline and monitor repeat electrolytes and UOP. BMP reviewed-  BMP  Lab Results   Component Value Date     02/15/2017    K 4.7 02/15/2017     02/15/2017    CO2 23 02/15/2017    BUN 84 (H) 02/15/2017    CREATININE 2.3 (H) 02/15/2017    CALCIUM 8.2 (L) 02/15/2017    ANIONGAP 9 02/15/2017    ESTGFRAFRICA 28 (A) 02/15/2017    EGFRNONAA 25 (A) 02/15/2017             PAF (paroxysmal atrial fibrillation)  Atrial Fibrillation controlled currently with Beta Blocker. [id number] score 4. Anticoagulation not indicated currently. Continue to monitor on telemetry.        Pacemaker  Interrogation done. Working properly.       Type 2 diabetes mellitus with circulatory disorder  FSGs controlled with pre-meal bolus + basal. Adjust as needed. A1C reviewed-  Lab Results   Component Value Date    HGBA1C 6.5 (H) 02/08/2017           MIKEY (acute kidney injury)  New onset- hydrate and monitor UOP/BMP. BMP reviewed-   BMP  Lab Results   Component Value Date     02/15/2017    K 4.7 02/15/2017     02/15/2017    CO2 23 02/15/2017    BUN 84 (H) 02/15/2017    CREATININE 2.3 (H) 02/15/2017    CALCIUM 8.2 (L) 02/15/2017    ANIONGAP 9 02/15/2017    ESTGFRAFRICA 28 (A) 02/15/2017    EGFRNONAA 25 (A) 02/15/2017           Uremic encephalopathy  New onset- BUN markedly elevated- likely affecting mental status. Will hydrate and trend uremia.      VTE Risk Mitigation         Ordered     Medium Risk of VTE  Once      02/07/17 2152          Severo Corrigan MD  Department of Hospital Medicine   [facility]

## 2017-02-16 NOTE — PROGRESS NOTES
1440  Contacted Stacie ([phone]) to f/u on delivery of bed.  Per Gretchen she will call me back within an hour to inform of delivery time.    1450  Updated pt's daughter Rylie ([phone]).    1615  Contacted Stacie to f/u; per Gretchen the  left 10 or 15 minutes ago to deliver the bed.    1617  Left voicemail for daughter Rylie with an update on bed.

## 2017-02-16 NOTE — PLAN OF CARE
Problem: Physical Therapy Goal  Goal: Physical Therapy Goal  Goals to be met by: 2017     Patient will increase functional independence with mobility by performin. Supine to sit with MInimal Assistance  2. Sit to stand transfer with Minimal Assistance  3. Bed to chair transfer with Minimal Assistance using Rolling Walker  4. Gait x 50 feet with Minimal Assistance using Rolling Walker.   5. Lower extremity exercise program x10 reps per handout, with assistance as needed   Outcome: Ongoing (interventions implemented as appropriate)  Pt tolerated tx well, ambulated from bed to chair with compromised safety when sitting in chair. Pt to be discharged today.

## 2017-02-16 NOTE — ASSESSMENT & PLAN NOTE
New onset- BUN markedly elevated- likely affecting mental status. Will hydrate and trend uremia.

## 2017-02-16 NOTE — PROGRESS NOTES
Unable to reach pt's daughter Rylie on the phone.  Contacted Dr Barrow, pt's son in law, regarding discharge transportation.  Dr Barrow said that he and his wife will likely  patient.

## 2017-02-16 NOTE — PROGRESS NOTES
02/16/17 0735   Patient Assessment/Suction   Level of Consciousness (AVPU) responds to voice   Respiratory Effort Unlabored   All Lung Fields Breath Sounds diminished   PRE-TX-O2-ETCO2   O2 Device (Oxygen Therapy) nasal cannula   $ Is the patient on Oxygen? Yes   Flow (L/min) 2   SpO2 98 %   Pulse Oximetry Type Intermittent   $ Pulse Oximetry - Multiple Charge Pulse Oximetry - Multiple   Pulse 61   Resp 16   Aerosol Therapy   $ Aerosol Therapy Charges Aerosol Treatment   Respiratory Treatment Status given   SVN/Inhaler Treatment Route mask   Position During Treatment HOB at 30-45 degrees   Patient Tolerance good   Post-Treatment   Post-treatment Heart Rate (beats/min) 65   Post-treatment Resp Rate (breaths/min) 16   All Fields Breath Sounds aeration increased   Duoneb mask tx Q6, tolerated well, NC 2L, vitals as charted.

## 2017-02-16 NOTE — PROGRESS NOTES
02/16/17 1155   Home Oxygen Qualification   Room Air SpO2 At Rest 92 %   Room Air SpO2 on Exertion (pt unable to ambulate, room air SpO2 done per Dr request.)   Recovery Heart Rate 64 bpm

## 2017-02-16 NOTE — PLAN OF CARE
Problem: Occupational Therapy Goal  Goal: Occupational Therapy Goal  Goals to be met by: 2/19/17     Patient will increase functional independence with ADLs by performing:    UE Dressing with Set-up Assistance and Stand-by Assistance.  LE Dressing with Set-up Assistance and Minimal Assistance.  REVISED: Prior level max assist per family and sitter.  Grooming while seated with Set-up Assistance.  Toileting from bedside commode with Set-up Assistance, Minimal Assistance and Assistive Devices as needed for hygiene and clothing management.   Toilet transfer to bedside commode with Minimal Assistance.   Outcome: Revised  Goals revised as indicated. Pt remains high fall risk. Per family, pt with 24/7 care in home.  CHRISTOPHER Leyva

## 2017-02-16 NOTE — SUBJECTIVE & OBJECTIVE
Interval History: Patient confusion improved today after fluids. Discussed with family. Improving.    Review of Systems   Constitutional: Positive for fatigue. Negative for chills and fever.   Respiratory: Positive for wheezing. Negative for cough and shortness of breath.    Cardiovascular: Negative for chest pain and leg swelling.   Gastrointestinal: Negative for abdominal pain, nausea and vomiting.   Musculoskeletal: Negative for back pain.   Neurological: Negative for weakness.   Psychiatric/Behavioral: Positive for confusion and dysphoric mood. The patient is not nervous/anxious.      Objective:     Vital Signs (Most Recent):  Temp: 97.6 °F (36.4 °C) (02/15/17 1500)  Pulse: 65 (02/15/17 1924)  Resp: 14 (02/15/17 1924)  BP: 139/64 (02/15/17 1500)  SpO2: 97 % (02/15/17 1924) Vital Signs (24h Range):  Temp:  [97.6 °F (36.4 °C)-98.6 °F (37 °C)] 97.6 °F (36.4 °C)  Pulse:  [61-77] 65  Resp:  [14-18] 14  SpO2:  [92 %-98 %] 97 %  BP: (118-164)/(53-68) 139/64     Weight: 95.3 kg (210 lb)  Body mass index is 28.48 kg/(m^2).    Intake/Output Summary (Last 24 hours) at 02/15/17 2019  Last data filed at 02/15/17 0500   Gross per 24 hour   Intake             1300 ml   Output                0 ml   Net             1300 ml      Physical Exam   Constitutional: No distress.   Frail, confused elderly WM in NAD.   HENT:   Head: Normocephalic and atraumatic.   Eyes: Pupils are equal, round, and reactive to light.   Neck: Neck supple. No thyromegaly present.   Cardiovascular: Normal rate and regular rhythm.  Exam reveals no gallop and no friction rub.    No murmur heard.  Pulmonary/Chest: Effort normal. No respiratory distress. He has wheezes.   Abdominal: Soft. Bowel sounds are normal. He exhibits no distension. There is no tenderness. There is no guarding.   Musculoskeletal: Normal range of motion. He exhibits no edema.   Neurological:   Lethargic, awakens briefly but confused   Skin: Skin is warm and dry. No erythema.    Psychiatric: He has a normal mood and affect.   Nursing note and vitals reviewed.      Significant Labs: Patient labs and imaging studies were reviewed and interpreted independently where remarked in assessment and plan where appropriate      Significant Imaging: I have reviewed and interpreted all pertinent imaging results/findings within the past 24 hours.

## 2017-02-16 NOTE — PT/OT/SLP PROGRESS
"Occupational Therapy  Treatment    Thierry Fu   MRN: [id number]   Admitting Diagnosis: Mild intermittent asthma with acute exacerbation    OT Date of Treatment: 17   OT Start Time: 08  OT Stop Time: 825  OT Total Time (min): 25 min    Billable Minutes:  Self Care/Home Management 9, Therapeutic Activity 8 and Therapeutic Exercise 8    General Precautions: Standard, fall  Orthopedic Precautions: N/A  Braces:           Subjective:  Communicated with nurse prior to session.  " I will pay you double if you let me lay back down."    Pain Ratin/10              Pain Rating Post-Intervention: 0/10    Objective:  Patient found with: peripheral IV, oxygen     Functional Mobility:  Bed Mobility:  Rolling/Turning to Left: Moderate assistance  Rolling/Turning Right: Moderate assistance  Scooting/Bridging: Moderate Assistance  Supine to Sit: Moderate Assistance    Transfers:   Sit <> Stand Assistance: Moderate Assistance  Sit <> Stand Assistive Device: No Assistive Device  Bed <> Chair Technique: Stand Pivot  Bed <> Chair Transfer Assistance: Moderate Assistance  Bed <> Chair Assistive Device: No Assistive Device    Functional Ambulation: moderate assist to step to bedside chair only.    Activities of Daily Living:  Feeding Level of Assistance: Set-up Assistance, Supervision    UE Dressing Level of Assistance: Moderate assistance (to change gown)    LE Dressing Level of Assistance: Total assistance    Grooming Position: bedside chair  Grooming Level of Assistance: Supervision  Toileting Where Assessed: Bed level (incontinent of urine)  Toileting Level of Assistance: Total assistance      Balance:   Static Sit: SBA-CG  Dynamic Sit: min assist  Static Stand: min assist  Dynamic stand: mod assist    Therapeutic Activities and Exercises:  Session began with AIDET. Pt agreeable to session. Session included bed mobility activities for toileting hygiene, transfer to bedside chair, UE and LE dressig, grooming, feeding " and B UE AROM all planes 5 reps from bedside chair.    AM-PAC 6 CLICK ADL   How much help from another person does this patient currently need?   1 = Unable, Total/Dependent Assistance  2 = A lot, Maximum/Moderate Assistance  3 = A little, Minimum/Contact Guard/Supervision  4 = None, Modified [place]/Independent    Putting on and taking off regular lower body clothing? : 1  Bathing (including washing, rinsing, drying)?: 2  Toileting, which includes using toilet, bedpan, or urinal? : 1  Putting on and taking off regular upper body clothing?: 3  Taking care of personal grooming such as brushing teeth?: 4  Eating meals?: 4  Total Score: 15     AM-PAC Raw Score CMS G-Code Modifier Level of Impairment Assistance   6 % Total / Unable   7 - 9 CM 80 - 100% Maximal Assist   10 - 14 CL 60 - 80% Moderate Assist   15 - 19 CK 40 - 60% Moderate Assist   20 - 22 CJ 20 - 40% Minimal Assist   23 CI 1-20% SBA / CGA   24 CH 0% Independent/ Mod I     Patient left up in chair with all lines intact, call button in reach and nurse and PCT notified    ASSESSMENT:  Thierry Fu is a 87 y.o. male with a medical diagnosis of Mild intermittent asthma with acute exacerbation . Pt. Presents with performance deficits of physical skills including impaired balance, mobility, strength, dexterity, fine motor coordination, gross motor coordination and endurance.  These performance deficits have resulted in activity limitations including, but not limited to: bed mobility, transfers, ambulating short distances, navigating around obstacles during ambulation, transitional movement patterns ( kneeling, bending, reaching), upper body dressing, lower body dressing, grooming, toileting, bathing and carrying objects.   Pt's level of ability to assist with ADL and mobility has been significantly affected.  Patient will benefit from skilled OT services to maximize level of independence with deficits listed above.   Pt cooperative with OT this  date. Priior level was 24 hr care in home.    Rehab identified problem list/impairments: Rehab identified problem list/impairments: weakness, impaired endurance, impaired self care skills, impaired functional mobilty, gait instability, impaired balance, decreased safety awareness, impaired cognition    Rehab potential is fair.    Activity tolerance: Fair    Discharge recommendations: Discharge Facility/Level Of Care Needs: nursing facility, skilled     Barriers to discharge: Barriers to Discharge: None    Equipment recommendations: none     GOALS:   Occupational Therapy Goals        Problem: Occupational Therapy Goal    Goal Priority Disciplines Outcome Interventions   Occupational Therapy Goal     OT, PT/OT Revised    Description:  Goals to be met by: 2/19/17     Patient will increase functional independence with ADLs by performing:    UE Dressing with Set-up Assistance and Stand-by Assistance.  LE Dressing with Set-up Assistance and Minimal Assistance.  REVISED: Prior level max assist per family and sitter.  Grooming while seated with Set-up Assistance.  Toileting from bedside commode with Set-up Assistance, Minimal Assistance and Assistive Devices as needed for hygiene and clothing management.   Toilet transfer to bedside commode with Minimal Assistance.                 Plan:  Patient to be seen 3 x/week to address the above listed problems via self-care/home management, therapeutic activities, cognitive retraining, therapeutic exercises  Plan of Care expires: 02/19/17  Plan of Care reviewed with: patient         CHRISTOPHER Leyva  02/16/2017

## 2017-02-16 NOTE — PLAN OF CARE
Problem: Patient Care Overview  Goal: Plan of Care Review  Outcome: Ongoing (interventions implemented as appropriate)  Pt allowed to sleep most of evening. Blood glucose was 120 at HS. No coverage needed. NS at 125 ml/hr to right a/c. Telemetry monitored. Heart rhythm is sinus. Bed alarm on. Side rails up x 2. Room near nurse's station.

## 2017-02-16 NOTE — ASSESSMENT & PLAN NOTE
New onset- hydrate and monitor UOP/BMP. BMP reviewed-   BMP  Lab Results   Component Value Date     02/15/2017    K 4.7 02/15/2017     02/15/2017    CO2 23 02/15/2017    BUN 84 (H) 02/15/2017    CREATININE 2.3 (H) 02/15/2017    CALCIUM 8.2 (L) 02/15/2017    ANIONGAP 9 02/15/2017    ESTGFRAFRICA 28 (A) 02/15/2017    EGFRNONAA 25 (A) 02/15/2017

## 2017-02-16 NOTE — PROGRESS NOTES
[facility] acknowledge home health orders and DME equipment for deliver to [facility] room 216.  SSC faxed patient information to [facility] [phone] per patient's choice and family choice.  Fax confirmation received.  SSC awaiting home O2 evaluation and MD orders for home O2 for patient.  SSC will fax to [facility] for delivery once orders are complete.  SSC spoke to patient's nurse and KJ Keller regarding home O2 evaluation.  VERONICA Hirsch      12:05PM S[facility] faxed equipment orders (nebulizer, wheelchair, hospital bed) to [facility] [phone].  Fax confirmation received. SSC still awaiting home O2 evaluation note and home O2 modification orders to send. SSC informed KJ Keller.  Per Arianna she informed patient's nurse Martin.VERONICA Hirsch     1:35pm S[facility] notified by KJ Keller patient do not qualify for home O2.VERONICA Hirsch    2:42pm SSC spoke to Gretchen with [facility] regarding delivery of equipment to [facility].  Equipment is still being process at this time.  She will contact KJ Keller once authorized by patient's insurance.  SSC informed her if after 430pm please contact patient's nurse's station.  SSC gave Gretchen number to nurse's station.  SSC informed patient's nurse Martin.VERONICA Hirsch

## 2017-02-16 NOTE — ASSESSMENT & PLAN NOTE
Atrial Fibrillation controlled currently with Beta Blocker. [id number] score 4. Anticoagulation not indicated currently. Continue to monitor on telemetry.

## 2017-02-16 NOTE — PT/OT/SLP PROGRESS
Physical Therapy  Treatment    Thierry Fu   MRN: [id number]   Admitting Diagnosis: Mild intermittent asthma with acute exacerbation    PT Received On: 17  PT Start Time: 1235     PT Stop Time: 1300    PT Total Time (min): 25 min       PT Start Time: 1500  PT Stop Time: 1510  PT Total Time (min): 10 minutes    Total Time: 35 minutes     Billable Minutes:  Gait Training 10 and Therapeutic Activity 25 min    Treatment Type: Treatment  PT/PTA: PTA     PTA Visit Number: 1       General Precautions: Standard, fall  Orthopedic Precautions: N/A   Braces: N/A         Subjective:  Communicated with darren Salazar prior to session.  Pt confused but agreeable to therapy.    Pain Ratin/10    Objective:   Patient found with: peripheral IV    Functional Mobility:  Bed Mobility:   Rolling/Turning Right: Minimum assistance (vcs for procedure)  Supine to Sit: Moderate Assistance, With side rail (vcs for hand placement)    Transfers:  Sit <> Stand Assistance: Minimum Assistance  Sit <> Stand Assistive Device: Rolling Walker  Pt unsafe with sit into the bedside chair. Pt given vcs to reach back, but let go of the walker with forward flexed posture and required minimal guidance to the chair to slow him down using the gait belt as he sat in the chair.     Gait:   Gait Distance: few steps to bedside chair ~12 feet  Assistance 1: Minimum assistance  Gait Assistive Device: Rolling walker  Gait Pattern: 2-point gait  Gait Deviation(s): decreased gabriela, increased time in double stance, decreased velocity of limb motion    Balance:   Static Sit: GOOD-: Takes MODERATE challenges from all directions but inconsistently  Dynamic Sit: GOOD-: Maintains balance through MODERATE excursions of active trunk movement,     Static Stand: GOOD-: Takes MODERATE challenges from all directions inconsistently  Dynamic stand: FAIR: Needs CONTACT GUARD during gait     Therapeutic Activities and Exercises:  Nsg communicated that pt unsafe when left  up in chair after first session because he attempts to get up without supervision. Nsg requested that pt not be moved to the chair during second visit and be kept in bed with the bed alarm on. Pt encouraged to do exercises in bed instead, but would not cooperate because he wanted to get out of bed.  Ankle pumps x 20 ea BLE  Pt educated on bed mobility to encourage independence.   Pt with some confusion today.     AM-PAC 6 CLICK MOBILITY  How much help from another person does this patient currently need?   1 = Unable, Total/Dependent Assistance  2 = A lot, Maximum/Moderate Assistance  3 = A little, Minimum/Contact Guard/Supervision  4 = None, Modified [place]/Independent    Turning over in bed (including adjusting bedclothes, sheets and blankets)?: 3  Sitting down on and standing up from a chair with arms (e.g., wheelchair, bedside commode, etc.): 2  Moving from lying on back to sitting on the side of the bed?: 2  Moving to and from a bed to a chair (including a wheelchair)?: 2  Need to walk in hospital room?: 2  Climbing 3-5 steps with a railing?: 1  Total Score: 12    AM-PAC Raw Score CMS G-Code Modifier Level of Impairment Assistance   6 % Total / Unable   7 - 9 CM 80 - 100% Maximal Assist   10 - 14 CL 60 - 80% Moderate Assist   15 - 19 CK 40 - 60% Moderate Assist   20 - 22 CJ 20 - 40% Minimal Assist   23 CI 1-20% SBA / CGA   24 CH 0% Independent/ Mod I     Patient left up in chair with all lines intact, call button in reach and nsg notified. 1st visit  Patient in bed, HOB elevated, bed alarm on, with all lines intact, call button in reach and nsg notified. 2nd visit    Assessment:  Thierry Fu is a 87 y.o. male with a medical diagnosis of Mild intermittent asthma with acute exacerbation and presents with the rehab identified problem list/impairments below. The pt tolerated the first session of tx well but was uncooperative for the second. The pt would benefit from continued skilled therapy  to address the goals in his plan of care.    Rehab identified problem list/impairments: Rehab identified problem list/impairments: weakness, impaired endurance, impaired self care skills, impaired functional mobilty, gait instability, impaired balance, decreased safety awareness    Rehab potential is good.    Activity tolerance: Good    Discharge recommendations: Discharge Facility/Level Of Care Needs: nursing facility, skilled     Barriers to discharge: Barriers to Discharge: None    Equipment recommendations: Equipment Needed After Discharge: none     GOALS:   Physical Therapy Goals        Problem: Physical Therapy Goal    Goal Priority Disciplines Outcome Goal Variances Interventions   Physical Therapy Goal    High PT/OT, PT Ongoing (interventions implemented as appropriate)     Description:  Goals to be met by: 2017     Patient will increase functional independence with mobility by performin. Supine to sit with MInimal Assistance  2. Sit to stand transfer with Minimal Assistance  3. Bed to chair transfer with Minimal Assistance using Rolling Walker  4. Gait  x 50 feet with Minimal Assistance using Rolling Walker.   5. Lower extremity exercise program x10 reps per handout, with assistance as needed                 PLAN:    Patient to be seen daily  to address the above listed problems via gait training, therapeutic activities, therapeutic exercises  Plan of Care expires: 17  Plan of Care reviewed with: patient         Ligia Shea, PTA  2017

## 2017-02-16 NOTE — PROGRESS NOTES
Re calorie count order:    Wednesday, 2/15/17, 575 calories for entire day.  Thursday, 2/16/17, calories for breakfast only 681.    Weights per chart review:  3/27/16: 95 kg/209 lbs, 6.4 oz  1/29/17: 94.9 kg/209 lb, 3.5 oz  2/07/17: 95.3 kg/210 lb.

## 2017-02-16 NOTE — ASSESSMENT & PLAN NOTE
Now with MIKEY- Will hydrate with normal saline and monitor repeat electrolytes and UOP. BMP reviewed-  BMP  Lab Results   Component Value Date     02/15/2017    K 4.7 02/15/2017     02/15/2017    CO2 23 02/15/2017    BUN 84 (H) 02/15/2017    CREATININE 2.3 (H) 02/15/2017    CALCIUM 8.2 (L) 02/15/2017    ANIONGAP 9 02/15/2017    ESTGFRAFRICA 28 (A) 02/15/2017    EGFRNONAA 25 (A) 02/15/2017

## 2017-02-16 NOTE — PROGRESS NOTES
1105  [facility] inform that [facility] is [facility] today; per Gina, the nurse, they are ready to accept patient and requested the discharge orders to be faxed.  Gina stated that they can  the patient this afternoon unless the family wants to drive him.  Patient is going to room 216.    122  Faxed [facility]c [facility] to [facility].

## 2017-02-16 NOTE — PT/OT/SLP PROGRESS
Physical Therapy  Treatment    Thierry Fu   MRN: [id number]   Admitting Diagnosis: Mild intermittent asthma with acute exacerbation  Pt seen BID today  PT Received On: 02/15/17  PT Start Time: 1155      1406  PT Stop Time: 1205       1438  PT Total Time (min): 10 min    32 min    Billable Minutes:  Gait [id number], Therapeutic Activity 10 and Therapeutic Exercise 17    Treatment Type: Treatment  PT/PTA: PT     PTA Visit Number: 5       General Precautions: Standard, fall  Orthopedic Precautions: N/A   Braces:           Subjective:  Communicated with nurse see in am and pm prior to session.  Pt alert, interactive, daughter present in pm  Pt stated this feels good, sitting EOB and completing LE thera ex                        Objective:        Functional Mobility:  Bed Mobility:  Mod assist for safe positioning and sitting EOB       Transfers: sit to stand mod assist       Gait: few steps in am  To the bedside chair- diaper donned    pm treatment- 12 ft x2 with RW mod assist with chair following and seated rest  with O2 SAT 92%    Stairs:      Balance:   Static Sit: FAIR+: Able to take MINIMAL challenges from all directions  Dynamic Sit: FAIR+: Maintains balance through MINIMAL excursions of active trunk motion  Static Stand: POOR+: Needs MINIMAL assist to maintain  Dynamic stand: POOR: N/A     Therapeutic Activities and Exercises:  Pt seen in am for transfers. Diaper donned due to period of incontinence  OOB to chair with tray table in front, nurse See aware and dooor kept wide open for supervision  Revisited in pm, up in chair and agreeable to PT   Gait training with RW/O2 SAT 92% with chair following and seated rest- legs remains unsteady with give away  Sat EOB and completed LE thera ex x 10.  Sit to stands x5 reps min/mod assist  Back to bed with all needs, repositioned with bed alarm on    AM-PAC 6 CLICK MOBILITY  How much help from another person does this patient currently need?   1 = Unable,  Total/Dependent Assistance  2 = A lot, Maximum/Moderate Assistance  3 = A little, Minimum/Contact Guard/Supervision  4 = None, Modified [place]/Independent         AM-PAC Raw Score CMS G-Code Modifier Level of Impairment Assistance   6 % Total / Unable   7 - 9 CM 80 - 100% Maximal Assist   10 - 14 CL 60 - 80% Moderate Assist   15 - 19 CK 40 - 60% Moderate Assist   20 - 22 CJ 20 - 40% Minimal Assist   23 CI 1-20% SBA / CGA   24 CH 0% Independent/ Mod I     Patient left supine with all lines intact and call button in reach.    Assessment:  Thierry Fu is a 87 y.o. male with a medical diagnosis of Mild intermittent asthma with acute exacerbation and presents with weakness, poor orientation with extra time for balance and to follow directions. Tolerated increased exercise time in pm. Pt to benefit from 24 hr supervision/SNF with therapies. Pt will be seen Daily .    Rehab identified problem list/impairments: Rehab identified problem list/impairments: weakness, impaired endurance, impaired self care skills, impaired functional mobilty, gait instability, impaired balance, impaired cognition, decreased lower extremity function, decreased safety awareness, impaired cardiopulmonary response to activity    Rehab potential is fair.    Activity tolerance: Fair    Discharge recommendations: Discharge Facility/Level Of Care Needs: nursing facility, skilled     Barriers to discharge:      Equipment recommendations:       GOALS:   Physical Therapy Goals        Problem: Physical Therapy Goal    Goal Priority Disciplines Outcome Goal Variances Interventions   Physical Therapy Goal    High PT/OT, PT Ongoing (interventions implemented as appropriate)     Description:  Goals to be met by: 2017     Patient will increase functional independence with mobility by performin. Supine to sit with MInimal Assistance  2. Sit to stand transfer with Minimal Assistance  3. Bed to chair transfer with Minimal  Assistance using Rolling Walker  4. Gait  x 50 feet with Minimal Assistance using Rolling Walker.   5. Lower extremity exercise program x10 reps per handout, with assistance as needed                 PLAN:    Patient to be seen daily  to address the above listed problems via gait training, therapeutic activities, therapeutic exercises  Plan of Care expires: 02/22/17  Plan of Care reviewed with: patient, daughter         Maria Isabel Corea, PT  02/16/2017

## 2017-02-17 ENCOUNTER — TELEPHONE (OUTPATIENT)
Dept: MEDSURG UNIT | Facility: HOSPITAL | Age: 82
End: 2017-02-17

## 2017-02-17 NOTE — PLAN OF CARE
02/17/17 1131   Final Note   Assessment Type Final Discharge Note   Discharge Disposition Home-Health   Discharge planning education complete? Yes   Hospital Follow Up  Appt(s) scheduled? Yes   Referral to / orders for Home Health Complete? Yes

## 2017-02-17 NOTE — DISCHARGE SUMMARY
[facility] Medicine  Discharge Summary      Patient Name: Thierry Fu  MRN: [id number]  Admission Date: 2/7/2017  Hospital Length of Stay: 9 days  Discharge Date and Time: 2/16/2017    Attending Physician: Severo Corrigan MD   Discharging Provider: Severo Corrigan MD  Primary Care Provider: Rosendo Barrow Jr, MD      HPI:   Mr. Fu is an 86 yo male  admitted to the services of \[facility]\"" medicine via the ER for asthma exacerbation. Unable to obtain history and ROS from pt due to dementia. History obtained from ER physician, family and previous epic records. Presents with CC of non-productive cough x 10 days. Associated with wheezing, SOB which are exacerbated by lying down. Started bronchodilator for wheezing and corticosteroids 3 days ago. No improvement noted. Remote history of smoking, quit 60 years ago. History of CAD and PPM. Diabetes type II controlled on oral medications.  Other past medical history as listed below.     * No surgery found *      Indwelling Lines/Drains at time of discharge:   Lines/Drains/Airways          No matching active lines, drains, or airways        Hospital Course:   Patient was admitted for hypoxia/wheezing along with delirium. He was treated for his atypical pneumonia/COPD with steroids and ABX and improved symptomatically. He underwent SLP eval and was cleared for diet. Patient was initially delirious but improved over course of hospital stay. He was found to be in MIKEY and was started on IV hydration. His mental status returned to baseline and he was set up for D/C at [facility] memory care unit with home health.    Patient and/or family was seen on day of discharge by myself and was examined. They were stable for discharge. Discharge plan, follow up instructions, and contacts in case of further needs were discussed in detail.          Consults:   Consults         Status Ordering Provider     Inpatient consult to Cardiology  Once      Provider:  Rosendo Davis MD    Final result [id number]     Inpatient consult to Social Work/Case Management  Once     Provider:  (Not yet assigned)    Completed JOHN ARAGON     IP consult to case management  Once     Provider:  (Not yet assigned)    Completed JOHN ARAGON          Significant Diagnostic Studies: Labs:   BMP:   Recent Labs  Lab 02/15/17  0623 02/16/17  0434   * 61*    139   K 4.7 4.2    109   CO2 23 24   BUN 84* 56*   CREATININE 2.3* 1.8*   CALCIUM 8.2* 8.1*    and CBC   Recent Labs  Lab 02/15/17  0623   WBC 19.70*   HGB 11.0*   HCT 33.9*          Pending Diagnostic Studies:     Procedure Component Value Units Date/Time    EKG 12-lead [[id number]]     Order Status:  Sent Lab Status:  No result         Final Active Diagnoses:    Diagnosis Date Noted POA    PRINCIPAL PROBLEM:  Mild intermittent asthma with acute exacerbation [J45.21] 02/07/2017 Yes    MIKEY (acute kidney injury) [N17.9] 02/14/2017 No    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, 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] 02/14/2017 No    Type 2 diabetes mellitus with circulatory disorder [E11.59] 04/28/2014 Yes    PAF (paroxysmal atrial fibrillation) [I48.0] 10/08/2013 Yes    Pacemaker [Z95.0] 10/08/2013 Yes    Chronic kidney disease (CKD), stage III (moderate) [N18.3] 01/25/2013 Yes    Essential hypertension, benign [I10] 01/24/2013 Yes    Late onset Alzheimer's disease without behavioral disturbance [G30.1, F02.80] 02/09/2012 Yes      Problems Resolved During this Admission:    Diagnosis Date Noted Date Resolved POA    Slow transit constipation [K59.01] 02/12/2017 02/13/2017 Yes      No new Assessment & Plan notes have been filed under this hospital service since the last note was generated.  Service: Hospital Medicine      Discharged Condition: stable    Disposition: Intermediate Care Facili*    Follow Up:  Follow-up Information     Follow up with Rosendo Barrow Jr, MD In 2 weeks.    Specialty:  Family Medicine    Contact  "information:    [address] [facility]  [place] [address]  [phone]          Follow up with [facility].    Specialty:  Home Health Services    Why:  Home Health    Contact information:    [address] [facility] [place] [address]  [phone]          Follow up with [facility].    Specialty:  DME Provider    Why:  DME    Contact information:    [address] [address]  [phone]          Patient Instructions:     HOSPITAL BED FOR HOME USE   Order Specific Question Answer Comments   Type: Semi-electric    Length of need (1-99 months): 99    Does patient have medical equipment at home? glucometer    Does patient have medical equipment at home? cane, quad    Does patient have medical equipment at home? rollator    Height: 6' (1.829 m)    Weight: 95.3 kg (210 lb)    Please check all that apply: Patient requires the head of bed to be elevated more than 30 degrees most of the time due to congestive heart failure, chronic pulmonary disease, or aspiration.  Pillows and wedges have been considered and ruled out.      NEBULIZER KIT (SUPPLIES) FOR HOME USE   Order Specific Question Answer Comments   Height: 6' (1.829 m)    Weight: 95.3 kg (210 lb)    Does patient have medical equipment at home? rollator    Does patient have medical equipment at home? cane, quad    Length of need (1-99 months): 99    Mask or Mouthpiece? Mask      WHEELCHAIR FOR HOME USE   Order Specific Question Answer Comments   Hours in W/C per day: 6    Type of Wheelchair: Standard    Size(Width): 18"(STD adult)    Leg Support: STD footrests    Lap Belt: Velcro    Cushion: Gel    Height: 6' (1.829 m)    Weight: 95.3 kg (210 lb)    Does patient have medical equipment at home? rollator    Does patient have medical equipment at home? cane, quad    Length of need (1-99 months): 99    Please check all that apply: Patient mobility limitations cannot be sufficiently resolved by the use of other ambulatory therapies.    Please " check all that apply: The patient requires the use of a w/c for activities of daily living within the Home.      NEBULIZER FOR HOME USE   Order Specific Question Answer Comments   Height: 6' (1.829 m)    Weight: 95.3 kg (210 lb)    Does patient have medical equipment at home? rollator    Does patient have medical equipment at home? cane, quad    Length of need (1-99 months): 99      Referral to Home health   Referral Priority: Routine Referral Type: Home Health Care   Referral Reason: Specialty Services Required    Requested Specialty: Home Health Services    Number of Visits Requested: 1      Diet Diabetic 2000 Calories     Activity as tolerated     Call MD for:  temperature >100.4     Call MD for:  severe uncontrolled pain     Call MD for:  increased confusion or weakness       Medications:  Reconciled Home Medications:   Current Discharge Medication List      START taking these medications    Details   acetaminophen (TYLENOL) 325 MG tablet Take 2 tablets (650 mg total) by mouth every 6 (six) hours as needed for Pain or Temperature greater than (or equal to 101 degree F).  Qty: 30 tablet, Refills: 0      insulin aspart (NOVOLOG) 100 unit/mL InPn pen Inject 0-5 Units into the skin before meals and at bedtime as needed (Hyperglycemia).  Qty: 3 mL, Refills: 0      polyethylene glycol (GLYCOLAX) 17 gram PwPk Take 17 g by mouth once daily.  Qty: 30 each, Refills: 0         CONTINUE these medications which have CHANGED    Details   insulin glargine (LANTUS SOLOSTAR) 100 unit/mL (3 mL) InPn pen Inject 18 Units into the skin once daily. inject 15 units subcutaneously once daily  Qty: 1 Syringe, Refills: 11    Associated Diagnoses: Uncontrolled type 2 diabetes mellitus with stage 4 chronic kidney disease, with long-term current use of insulin      isosorbide mononitrate (IMDUR) 60 MG 24 hr tablet Take 1 tablet (60 mg total) by mouth once daily.  Qty: 30 tablet, Refills: 11    Associated Diagnoses: CAD (coronary artery  disease)      predniSONE (DELTASONE) 10 MG tablet Take 1 tablet (10 mg total) by mouth once daily.  Qty: 4 tablet, Refills: 0      quetiapine (SEROQUEL) 25 MG Tab Take 1 tablet (25 mg total) by mouth 2 (two) times daily.  Qty: 60 tablet, Refills: 0         CONTINUE these medications which have NOT CHANGED    Details   albuterol 90 mcg/actuation inhaler Inhale 2 puffs into the lungs every 6 (six) hours as needed for Wheezing. Rescue  Qty: 1 Inhaler, Refills: 3    Associated Diagnoses: Acute bronchitis, unspecified organism      clopidogrel (PLAVIX) 75 mg tablet take 1 tablet by mouth once daily  Qty: 30 tablet, Refills: 11    Associated Diagnoses: Coronary artery disease involving native coronary artery without angina pectoris      duloxetine (CYMBALTA) 60 MG capsule take 1 capsule by mouth once daily  Qty: 30 capsule, Refills: 11    Associated Diagnoses: Depression      ferrous gluconate (FERGON) 324 MG tablet Take 1 tablet (324 mg total) by mouth 2 (two) times daily with meals.      inhalation device (AEROCHAMBER PLUS FLOW-VU) Use as directed for inhalation.  Qty: 1 Device, Refills: 0    Associated Diagnoses: Acute bronchitis, unspecified organism      insulin lispro (HUMALOG KWIKPEN) 100 unit/mL InPn pen inject 5 units AT LUNCH TIME MEAL Use in addition to sliding scale  Qty: 15 mL, Refills: 11    Associated Diagnoses: Type 2 diabetes mellitus, uncontrolled      rivastigmine (EXELON) 9.5 mg/24 hr PT24 apply 1 patch once daily  Qty: 30 patch, Refills: 11    Associated Diagnoses: Dementia without behavioral disturbance      senna-docusate 8.6-50 mg (PERICOLACE) 8.6-50 mg per tablet Take 1 tablet by mouth 2 (two) times daily.      simvastatin (ZOCOR) 40 MG tablet take 1 tablet by mouth at bedtime  Qty: 30 tablet, Refills: 11    Associated Diagnoses: Hyperlipidemia      sotalol (BETAPACE) 120 MG Tab take 1 tablet by mouth twice a day  Qty: 60 tablet, Refills: 11    Associated Diagnoses: Congestive heart failure with  "left ventricular diastolic dysfunction, chronic; PAF (paroxysmal atrial fibrillation)      VESICARE 5 mg tablet take 1 tablet by mouth once daily  Qty: 30 tablet, Refills: 11      nitroGLYCERIN 0.4 MG/DOSE TL SPRY (NITROLINGUAL) 400 mcg/spray spray USE 1 TO 2 SPRAYS UNDER THE TONGUE IF NEEDED FOR CHEST PAINS  Qty: 12 g, Refills: 10    Associated Diagnoses: Coronary artery disease involving native coronary artery without angina pectoris      ONETOUCH ULTRA TEST Strp TEST three times a day  Qty: 100 strip, Refills: 11    Comments: Patient now on insulin (250.72)  Associated Diagnoses: Type 2 diabetes mellitus without complication      pantoprazole (PROTONIX) 40 MG tablet Take 1 tablet (40 mg total) by mouth once daily.  Qty: 30 tablet, Refills: 0      promethazine-codeine 6.25-10 mg/5 ml (PHENERGAN WITH CODEINE) 6.25-10 mg/5 mL syrup Take 5 mLs by mouth every 6 (six) hours as needed for Cough.  Qty: 240 mL, Refills: 0    Associated Diagnoses: Acute bronchitis, unspecified organism         STOP taking these medications       furosemide (LASIX) 20 MG tablet Comments:   Reason for Stopping:         glyBURIDE (DIABETA) 5 MG tablet Comments:   Reason for Stopping:         nitroGLYCERIN (NITROSTAT) 0.4 MG SL tablet Comments:   Reason for Stopping:         valsartan (DIOVAN) 160 MG tablet Comments:   Reason for Stopping:         azithromycin (ZITHROMAX Z-UNA) 250 MG tablet Comments:   Reason for Stopping:         BD INSULIN PEN NEEDLE UF SHORT 31 gauge x 5/16" Ndle Comments:   Reason for Stopping:             Time spent on the discharge of patient: 39 minutes    Severo Corrigan MD  Department of Hospital Medicine  [facility]  "

## 2017-02-17 NOTE — NURSING
Pt's medication and discharge instructions given and reviewed with his daughter and son in law, understanding verbalized.  PIV removed.  Vitals stable.  Denies pain and discomfort at this time.  Discharged home with family.

## 2017-02-20 DIAGNOSIS — J45.20 MILD INTERMITTENT ASTHMA WITHOUT COMPLICATION: Primary | ICD-10-CM

## 2017-02-20 RX ORDER — ALBUTEROL SULFATE 0.63 MG/3ML
0.63 SOLUTION RESPIRATORY (INHALATION) EVERY 6 HOURS PRN
Qty: 150 ML | Refills: 11 | Status: SHIPPED | OUTPATIENT
Start: 2017-02-20 | End: 2018-02-20

## 2017-02-21 ENCOUNTER — TELEPHONE (OUTPATIENT)
Dept: FAMILY MEDICINE | Facility: CLINIC | Age: 82
End: 2017-02-21

## 2017-02-21 NOTE — TELEPHONE ENCOUNTER
----- Message from Michael Kim sent at 2/21/2017  1:56 PM CST -----  Contact: Gina/Jb Fuentes called in regarding the attached patient.  Gina stated patient is having difficulty with abdominal breathing.  Gina stated that patient cannot cough to clear himself.  Gina stated she does not believe it is heart related.  Gina stated that patient states he is feeling fine though.    Gina call back number [phone].  Gina just wanted Dr. Barrow to be aware.

## 2017-02-22 ENCOUNTER — DOCUMENTATION ONLY (OUTPATIENT)
Dept: FAMILY MEDICINE | Facility: CLINIC | Age: 82
End: 2017-02-22

## 2017-02-22 NOTE — PROGRESS NOTES
Pre-Visit Chart Review  For Appointment Scheduled on 2/23/17    Health Maintenance Due   Topic Date Due    Foot Exam  10/08/1939    Zoster Vaccine  10/08/1989    Eye Exam  11/13/2015

## 2017-02-23 ENCOUNTER — TELEPHONE (OUTPATIENT)
Dept: FAMILY MEDICINE | Facility: CLINIC | Age: 82
End: 2017-02-23

## 2017-02-23 NOTE — TELEPHONE ENCOUNTER
----- Message from Michael HAYDEN Judy sent at 2/23/2017 12:53 PM CST -----  Contact: Nataliya/[facility]  Nataliya called in regarding the attached patient.  Nataliya stated patients fasting blood sugar 415 this Am and then checked again at 12:noon and it was 173.  (Nataliya stated that it appears the sitter (private) may have given some cough syrup to patient).  Nataliya also wanted to let Dr. Barrow know that patient still does indeed have a cough.    Nataliya attempted to get BMP but could not and will try to get again next week.  Patient was in the middle of breakfast.      Nataliya's call back number is [phone]

## 2017-03-01 ENCOUNTER — TELEPHONE (OUTPATIENT)
Dept: FAMILY MEDICINE | Facility: CLINIC | Age: 82
End: 2017-03-01

## 2017-03-01 NOTE — TELEPHONE ENCOUNTER
Spoke to Pamela at [facility], [place], she is sending a nurse out today, she will call back if anything has changed.     Dr. Barrow are you going to see Mr. Fu?

## 2017-03-01 NOTE — TELEPHONE ENCOUNTER
----- Message from Segun Guerrero sent at 3/1/2017 10:59 AM CST -----  Sigrid ([facility]) called asking to speak with a nurse regarding the pt stated that the pt has a change in ment status, O2 stats are low 88 and 91/pls call back at [phone]

## 2017-03-01 NOTE — TELEPHONE ENCOUNTER
Sigrid with [facility] states Mr. Fu is not alert as usual O2 is low 88-91 all other vital signs are fine.     What would you like to do?

## 2017-03-01 NOTE — TELEPHONE ENCOUNTER
----- Message from Rhiannon Wahl sent at 3/1/2017  9:45 AM CST -----  Contact: Pamela mychal/[facility] [phone]  She is calling to report that his O2 sats have dropped since yesterday.  They were 94, now they are 88.

## 2017-03-01 NOTE — TELEPHONE ENCOUNTER
----- Message from Rhiannon Wahl sent at 3/1/2017  9:45 AM CST -----  Contact: Pamela mychal/[facility] [phone]  She is calling to report that his O2 sats have dropped since yesterday.  They were 94, now they are 88.

## 2017-03-01 NOTE — TELEPHONE ENCOUNTER
Spoke with Pamela with [facility].  States pt's O2 was 94% and has dropped to 88% and he looks pale, has decreased appetite, and isn't drinking.  Attempted to get CBC yesterday, but couldn't collect it because pt is dehydrated.  Explained to Pamela I will give message to nurse to see if pt should go to ER or if a nurse will need to go out to home.  Voiced understanding.

## 2017-03-02 ENCOUNTER — TELEPHONE (OUTPATIENT)
Dept: FAMILY MEDICINE | Facility: CLINIC | Age: 82
End: 2017-03-02

## 2017-03-02 DIAGNOSIS — F02.80 LATE ONSET ALZHEIMER'S DISEASE WITHOUT BEHAVIORAL DISTURBANCE: Primary | ICD-10-CM

## 2017-03-02 DIAGNOSIS — G30.1 LATE ONSET ALZHEIMER'S DISEASE WITHOUT BEHAVIORAL DISTURBANCE: Primary | ICD-10-CM

## 2017-03-02 DIAGNOSIS — I25.119 ATHEROSCLEROSIS OF NATIVE CORONARY ARTERY WITH ANGINA PECTORIS, UNSPECIFIED WHETHER NATIVE OR TRANSPLANTED HEART: ICD-10-CM

## 2017-03-02 DIAGNOSIS — R62.7 FAILURE TO THRIVE IN ADULT: ICD-10-CM

## 2017-03-24 ENCOUNTER — TELEPHONE (OUTPATIENT)
Dept: FAMILY MEDICINE | Facility: CLINIC | Age: 82
End: 2017-03-24

## 2017-03-24 NOTE — TELEPHONE ENCOUNTER
----- Message from Ross Gomez sent at 3/24/2017  2:08 PM CDT -----  Contact: Josselin with [facility]  Josselin with [facility] called requesting H and P or visit note for the patient between the dates of 12/2016-03/01/2017. Please fax to [phone] or if any questions call back at [phone]. Thanks,